# Patient Record
Sex: MALE | Race: WHITE | NOT HISPANIC OR LATINO | Employment: OTHER | URBAN - METROPOLITAN AREA
[De-identification: names, ages, dates, MRNs, and addresses within clinical notes are randomized per-mention and may not be internally consistent; named-entity substitution may affect disease eponyms.]

---

## 2018-07-25 ENCOUNTER — TELEPHONE (OUTPATIENT)
Dept: CARDIOLOGY CLINIC | Facility: CLINIC | Age: 63
End: 2018-07-25

## 2018-07-25 RX ORDER — CYCLOBENZAPRINE HCL 10 MG
1 TABLET ORAL 3 TIMES DAILY PRN
COMMUNITY
Start: 2016-08-23 | End: 2019-01-02 | Stop reason: ALTCHOICE

## 2018-07-25 RX ORDER — RABEPRAZOLE SODIUM 20 MG/1
TABLET, DELAYED RELEASE ORAL EVERY 24 HOURS
COMMUNITY
Start: 2014-06-17

## 2018-07-25 RX ORDER — OLMESARTAN MEDOXOMIL 20 MG/1
TABLET ORAL EVERY 12 HOURS
COMMUNITY
Start: 2017-12-05 | End: 2019-01-04 | Stop reason: SDUPTHER

## 2018-07-25 NOTE — TELEPHONE ENCOUNTER
Pt called, reports he is not feeling well in the evenings  At that time, his BP is 82/47  Since you placed him on Chlorthalidone 12 5mg QD on 6/19/18, his ankles are no longer swelling but he has noted his BP's trending down  His only other cardiac med is Benicar 20mg BID  What are your recommendations?

## 2018-07-26 NOTE — TELEPHONE ENCOUNTER
Reduce the Benicar to 20 mg daily at bedtime (once daily) but continue the chlorthalidone since it is reducing the swelling in his legs  Have him report back to us in several days to a week whether his lightheadedness has resolved and his blood pressure is above 840 systolic all the time

## 2018-12-19 PROBLEM — E78.2 MIXED HYPERLIPIDEMIA: Status: ACTIVE | Noted: 2018-12-19

## 2019-01-02 ENCOUNTER — OFFICE VISIT (OUTPATIENT)
Dept: CARDIOLOGY CLINIC | Facility: CLINIC | Age: 64
End: 2019-01-02
Payer: COMMERCIAL

## 2019-01-02 VITALS
HEIGHT: 74 IN | SYSTOLIC BLOOD PRESSURE: 130 MMHG | OXYGEN SATURATION: 98 % | HEART RATE: 61 BPM | WEIGHT: 262 LBS | BODY MASS INDEX: 33.62 KG/M2 | DIASTOLIC BLOOD PRESSURE: 80 MMHG

## 2019-01-02 DIAGNOSIS — I10 ESSENTIAL HYPERTENSION: Primary | ICD-10-CM

## 2019-01-02 DIAGNOSIS — E78.2 MIXED HYPERLIPIDEMIA: ICD-10-CM

## 2019-01-02 PROCEDURE — 93000 ELECTROCARDIOGRAM COMPLETE: CPT | Performed by: INTERNAL MEDICINE

## 2019-01-02 PROCEDURE — 99214 OFFICE O/P EST MOD 30 MIN: CPT | Performed by: INTERNAL MEDICINE

## 2019-01-02 NOTE — PROGRESS NOTES
Cardiology Follow Up    Lai Nelson  1955  [id number]  [address] [facility]  [address], [facility] [address] [phone]  [phone]    1  Essential hypertension  POCT ECG   2  Mixed hyperlipidemia         Patient was originally seen in my old office for treatment of hypertension and hyperlipidemia  6/27/2018 FLP:  Total cholesterol 194, triglycerides 115, HDL 44, non HDL cholesterol 150, LDL calculated 127    Interval History:   Patient returns  Last visit he was started on a mild diuretic for for an increase in his blood pressure  He took it for about a month and did not feel good and stopped it  However since then he has been very careful about his salt  He reports that most the time his blood pressure is less than 849 systolic  Patient Active Problem List   Diagnosis    Hypertension    Mixed hyperlipidemia     No past medical history on file  Social History     Social History    Marital status: /Civil Union     Spouse name: N/A    Number of children: N/A    Years of education: N/A     Occupational History    Not on file  Social History Main Topics    Smoking status: Never Smoker    Smokeless tobacco: Not on file    Alcohol use Not on file    Drug use: Unknown    Sexual activity: Not on file     Other Topics Concern    Not on file     Social History Narrative    No narrative on file      Family History   Problem Relation Age of Onset    Coronary artery disease Mother     Lung cancer Father      Past Surgical History:   Procedure Laterality Date    APPENDECTOMY      At age 25         Current Outpatient Prescriptions:     olmesartan (BENICAR) 20 mg tablet, Every 12 hours, Disp: , Rfl:     RABEprazole (ACIPHEX) 20 MG tablet, every 24 hours, Disp: , Rfl:   No Known Allergies    Results for orders placed or performed in visit on 01/02/19   POCT ECG    Narrative    Normal sinus rhythm at a rate of 61 beats per minute  Borderline 1st degree AV block at 214 milliseconds  Borderline EKG  Review of Systems:  Review of Systems   Constitutional: Negative for activity change  Respiratory: Negative for cough, chest tightness, shortness of breath and wheezing  Cardiovascular: Negative for chest pain, palpitations and leg swelling  Musculoskeletal: Negative for gait problem  Skin: Negative for color change  Neurological: Negative for dizziness, tremors, syncope, weakness, light-headedness and headaches  Psychiatric/Behavioral: Negative for agitation and confusion  Physical Exam:  /80 (BP Location: Left arm, Patient Position: Sitting, Cuff Size: Adult)   Pulse 61   Ht 6' 2" (1 88 m)   Wt 119 kg (262 lb)   SpO2 98%   BMI 33 64 kg/m²   Physical Exam   Constitutional: He is oriented to person, place, and time  He appears well-developed and well-nourished  No distress  HENT:   Head: Normocephalic and atraumatic  Neck: No JVD present  Cardiovascular: Normal rate, regular rhythm, normal heart sounds and intact distal pulses  Exam reveals no gallop and no friction rub  No murmur heard  Pulmonary/Chest: Effort normal and breath sounds normal  No respiratory distress  He has no wheezes  He has no rales  He exhibits no tenderness  Abdominal: Soft  Bowel sounds are normal  He exhibits no distension  Musculoskeletal: He exhibits no edema  Neurological: He is alert and oriented to person, place, and time  Skin: Skin is warm and dry  Psychiatric: He has a normal mood and affect  His behavior is normal        Discussion/Summary:  1  Continue present medications and follow blood pressure  2    Return in 6 months

## 2019-01-04 DIAGNOSIS — I10 HYPERTENSION, UNSPECIFIED TYPE: Primary | ICD-10-CM

## 2019-01-07 RX ORDER — OLMESARTAN MEDOXOMIL 20 MG/1
20 TABLET ORAL 2 TIMES DAILY
Qty: 180 TABLET | Refills: 6 | Status: SHIPPED | OUTPATIENT
Start: 2019-01-07 | End: 2019-05-31 | Stop reason: RX

## 2019-05-11 ENCOUNTER — OFFICE VISIT (OUTPATIENT)
Dept: URGENT CARE | Facility: MEDICAL CENTER | Age: 64
End: 2019-05-11
Payer: COMMERCIAL

## 2019-05-11 VITALS
WEIGHT: 231 LBS | SYSTOLIC BLOOD PRESSURE: 134 MMHG | HEART RATE: 96 BPM | HEIGHT: 73 IN | DIASTOLIC BLOOD PRESSURE: 80 MMHG | OXYGEN SATURATION: 97 % | RESPIRATION RATE: 20 BRPM | TEMPERATURE: 99.6 F | BODY MASS INDEX: 30.62 KG/M2

## 2019-05-11 DIAGNOSIS — J02.9 SORE THROAT: ICD-10-CM

## 2019-05-11 DIAGNOSIS — J40 BRONCHITIS: Primary | ICD-10-CM

## 2019-05-11 LAB — S PYO AG THROAT QL: NEGATIVE

## 2019-05-11 PROCEDURE — S9083 URGENT CARE CENTER GLOBAL: HCPCS | Performed by: PHYSICIAN ASSISTANT

## 2019-05-11 PROCEDURE — 87070 CULTURE OTHR SPECIMN AEROBIC: CPT | Performed by: PHYSICIAN ASSISTANT

## 2019-05-11 PROCEDURE — 87430 STREP A AG IA: CPT | Performed by: PHYSICIAN ASSISTANT

## 2019-05-11 PROCEDURE — G0383 LEV 4 HOSP TYPE B ED VISIT: HCPCS | Performed by: PHYSICIAN ASSISTANT

## 2019-05-11 RX ORDER — PREDNISONE 10 MG/1
TABLET ORAL
Qty: 18 TABLET | Refills: 0 | Status: SHIPPED | OUTPATIENT
Start: 2019-05-11 | End: 2019-06-24 | Stop reason: ALTCHOICE

## 2019-05-11 RX ORDER — BENZONATATE 200 MG/1
200 CAPSULE ORAL 3 TIMES DAILY PRN
Qty: 20 CAPSULE | Refills: 0 | Status: SHIPPED | OUTPATIENT
Start: 2019-05-11 | End: 2019-06-24 | Stop reason: ALTCHOICE

## 2019-05-14 LAB — BACTERIA THROAT CULT: NORMAL

## 2019-05-31 ENCOUNTER — TELEPHONE (OUTPATIENT)
Dept: CARDIOLOGY CLINIC | Facility: CLINIC | Age: 64
End: 2019-05-31

## 2019-05-31 DIAGNOSIS — I10 ESSENTIAL HYPERTENSION, BENIGN: Primary | ICD-10-CM

## 2019-05-31 RX ORDER — LISINOPRIL 20 MG/1
20 TABLET ORAL DAILY
Qty: 30 TABLET | Refills: 5 | Status: SHIPPED | OUTPATIENT
Start: 2019-05-31 | End: 2019-06-24 | Stop reason: ALTCHOICE

## 2019-06-24 ENCOUNTER — OFFICE VISIT (OUTPATIENT)
Dept: CARDIOLOGY CLINIC | Facility: CLINIC | Age: 64
End: 2019-06-24
Payer: COMMERCIAL

## 2019-06-24 VITALS
DIASTOLIC BLOOD PRESSURE: 80 MMHG | SYSTOLIC BLOOD PRESSURE: 120 MMHG | HEIGHT: 73 IN | OXYGEN SATURATION: 98 % | BODY MASS INDEX: 30.32 KG/M2 | HEART RATE: 66 BPM | WEIGHT: 228.8 LBS

## 2019-06-24 DIAGNOSIS — E78.2 MIXED HYPERLIPIDEMIA: ICD-10-CM

## 2019-06-24 DIAGNOSIS — I10 ESSENTIAL HYPERTENSION: Primary | ICD-10-CM

## 2019-06-24 PROCEDURE — 99214 OFFICE O/P EST MOD 30 MIN: CPT | Performed by: INTERNAL MEDICINE

## 2019-06-24 RX ORDER — OLMESARTAN MEDOXOMIL 20 MG/1
20 TABLET ORAL 2 TIMES DAILY
Qty: 180 TABLET | Refills: 3 | Status: SHIPPED | OUTPATIENT
Start: 2019-06-24 | End: 2020-06-08 | Stop reason: SDUPTHER

## 2019-06-24 RX ORDER — OLMESARTAN MEDOXOMIL 20 MG/1
20 TABLET ORAL 2 TIMES DAILY
Qty: 180 TABLET | Refills: 3
Start: 2019-06-24 | End: 2019-06-24 | Stop reason: SDUPTHER

## 2019-06-24 RX ORDER — OLMESARTAN MEDOXOMIL 20 MG/1
20 TABLET ORAL 2 TIMES DAILY
Qty: 180 TABLET | Refills: 3 | Status: SHIPPED | OUTPATIENT
Start: 2019-06-24 | End: 2019-06-24 | Stop reason: SDUPTHER

## 2020-06-08 ENCOUNTER — OFFICE VISIT (OUTPATIENT)
Dept: CARDIOLOGY CLINIC | Facility: CLINIC | Age: 65
End: 2020-06-08
Payer: MEDICARE

## 2020-06-08 VITALS
DIASTOLIC BLOOD PRESSURE: 80 MMHG | SYSTOLIC BLOOD PRESSURE: 136 MMHG | BODY MASS INDEX: 27.97 KG/M2 | WEIGHT: 212 LBS | TEMPERATURE: 97.8 F

## 2020-06-08 DIAGNOSIS — E78.2 MIXED HYPERLIPIDEMIA: ICD-10-CM

## 2020-06-08 DIAGNOSIS — I10 ESSENTIAL HYPERTENSION: Primary | ICD-10-CM

## 2020-06-08 DIAGNOSIS — N52.9 ERECTILE DYSFUNCTION, UNSPECIFIED ERECTILE DYSFUNCTION TYPE: ICD-10-CM

## 2020-06-08 PROCEDURE — 99214 OFFICE O/P EST MOD 30 MIN: CPT | Performed by: INTERNAL MEDICINE

## 2020-06-08 RX ORDER — SILDENAFIL 25 MG/1
25 TABLET, FILM COATED ORAL DAILY PRN
Qty: 30 TABLET | Refills: 3 | Status: SHIPPED | OUTPATIENT
Start: 2020-06-08 | End: 2021-06-28 | Stop reason: SDUPTHER

## 2020-06-08 RX ORDER — OLMESARTAN MEDOXOMIL 20 MG/1
20 TABLET ORAL 2 TIMES DAILY
Qty: 180 TABLET | Refills: 3 | Status: SHIPPED | OUTPATIENT
Start: 2020-06-08 | End: 2021-05-25

## 2020-11-30 ENCOUNTER — OFFICE VISIT (OUTPATIENT)
Dept: UROLOGY | Facility: MEDICAL CENTER | Age: 65
End: 2020-11-30
Payer: MEDICARE

## 2020-11-30 VITALS
BODY MASS INDEX: 27.46 KG/M2 | HEIGHT: 74 IN | DIASTOLIC BLOOD PRESSURE: 86 MMHG | WEIGHT: 214 LBS | HEART RATE: 81 BPM | SYSTOLIC BLOOD PRESSURE: 122 MMHG

## 2020-11-30 DIAGNOSIS — Z87.442 PERSONAL HISTORY OF KIDNEY STONES: ICD-10-CM

## 2020-11-30 DIAGNOSIS — N13.8 BENIGN PROSTATIC HYPERPLASIA WITH URINARY OBSTRUCTION: Primary | ICD-10-CM

## 2020-11-30 DIAGNOSIS — N40.1 BENIGN PROSTATIC HYPERPLASIA WITH URINARY OBSTRUCTION: Primary | ICD-10-CM

## 2020-11-30 LAB
SL AMB  POCT GLUCOSE, UA: ABNORMAL
SL AMB LEUKOCYTE ESTERASE,UA: ABNORMAL
SL AMB POCT BILIRUBIN,UA: ABNORMAL
SL AMB POCT BLOOD,UA: ABNORMAL
SL AMB POCT CLARITY,UA: CLEAR
SL AMB POCT COLOR,UA: ABNORMAL
SL AMB POCT KETONES,UA: 15
SL AMB POCT NITRITE,UA: ABNORMAL
SL AMB POCT PH,UA: 5.5
SL AMB POCT SPECIFIC GRAVITY,UA: 1.02
SL AMB POCT URINE PROTEIN: ABNORMAL
SL AMB POCT UROBILINOGEN: 0.2

## 2020-11-30 PROCEDURE — 81003 URINALYSIS AUTO W/O SCOPE: CPT | Performed by: UROLOGY

## 2020-11-30 PROCEDURE — 99204 OFFICE O/P NEW MOD 45 MIN: CPT | Performed by: UROLOGY

## 2020-12-01 ENCOUNTER — HOSPITAL ENCOUNTER (OUTPATIENT)
Dept: ULTRASOUND IMAGING | Facility: MEDICAL CENTER | Age: 65
Discharge: HOME | End: 2020-12-01
Attending: UROLOGY
Payer: MEDICARE

## 2020-12-01 DIAGNOSIS — N13.8 BENIGN PROSTATIC HYPERPLASIA WITH URINARY OBSTRUCTION: ICD-10-CM

## 2020-12-01 DIAGNOSIS — N40.1 BENIGN PROSTATIC HYPERPLASIA WITH URINARY OBSTRUCTION: ICD-10-CM

## 2020-12-01 DIAGNOSIS — Z87.442 PERSONAL HISTORY OF KIDNEY STONES: ICD-10-CM

## 2020-12-01 PROCEDURE — 51798 US URINE CAPACITY MEASURE: CPT

## 2020-12-04 ENCOUNTER — TELEPHONE (OUTPATIENT)
Dept: UROLOGY | Facility: MEDICAL CENTER | Age: 65
End: 2020-12-04

## 2021-05-03 ENCOUNTER — TELEPHONE (OUTPATIENT)
Dept: CARDIOLOGY CLINIC | Facility: CLINIC | Age: 66
End: 2021-05-03

## 2021-05-03 NOTE — TELEPHONE ENCOUNTER
Mr Margareth Concepcion sent notice through [employer] stating that the patient was needing a prior authorization for his Olmesartan medoxomil  Prior Auth was completed through [employer]  Key: [id number]  Waiting on determination

## 2021-05-04 NOTE — TELEPHONE ENCOUNTER
Prior Authorization: Approved  Start Date: 05/03/2021  End Date: until Further notice  Notice will be scanned into patient's chart

## 2021-05-25 DIAGNOSIS — I10 ESSENTIAL HYPERTENSION: ICD-10-CM

## 2021-05-25 RX ORDER — OLMESARTAN MEDOXOMIL 20 MG/1
TABLET ORAL
Qty: 180 TABLET | Refills: 3 | Status: SHIPPED | OUTPATIENT
Start: 2021-05-25 | End: 2022-05-25

## 2021-06-23 ENCOUNTER — OFFICE VISIT (OUTPATIENT)
Dept: UROLOGY | Facility: MEDICAL CENTER | Age: 66
End: 2021-06-23
Payer: MEDICARE

## 2021-06-23 VITALS
BODY MASS INDEX: 28.49 KG/M2 | WEIGHT: 222 LBS | HEART RATE: 65 BPM | HEIGHT: 74 IN | DIASTOLIC BLOOD PRESSURE: 80 MMHG | SYSTOLIC BLOOD PRESSURE: 132 MMHG

## 2021-06-23 DIAGNOSIS — N40.1 BENIGN PROSTATIC HYPERPLASIA WITH URINARY OBSTRUCTION: Primary | ICD-10-CM

## 2021-06-23 DIAGNOSIS — Z87.442 PERSONAL HISTORY OF KIDNEY STONES: ICD-10-CM

## 2021-06-23 DIAGNOSIS — N13.8 BENIGN PROSTATIC HYPERPLASIA WITH URINARY OBSTRUCTION: Primary | ICD-10-CM

## 2021-06-23 PROCEDURE — 99214 OFFICE O/P EST MOD 30 MIN: CPT | Performed by: UROLOGY

## 2021-06-23 NOTE — PROGRESS NOTES
Assessment/Plan:    Benign prostatic hyperplasia with urinary obstruction  The patient's voiding symptoms have been getting worse  AUA symptom score is 20  PSA is normal at 2 39 (June 4, 2021)  He is mixed about his voiding pattern  Options were discussed including medical therapy, urolift, GreenLight laser and transurethral resection of the prostate  At the conclusion of our discussion I recommended the patient return for further evaluation to include uroflow with postvoid residual, cystoscopy, transrectal ultrasound for prostate size  The patient may also wish to try saw palmetto  Personal history of kidney stones  The patient remained asymptomatic  Ultrasound in early 2020 did not reveal any residual kidney stones  Diagnoses and all orders for this visit:    Benign prostatic hyperplasia with urinary obstruction    Personal history of kidney stones          Subjective:      Patient ID: Quincy Hamm is a 77 y o  male  Benign Prostatic Hypertrophy  This is a new problem  The current episode started more than 1 month ago  The problem has been gradually worsening since onset  Irritative symptoms include frequency (occasional) and nocturia (Nocturia x 3)  Irritative symptoms do not include urgency  Obstructive symptoms include dribbling, incomplete emptying, an intermittent stream and a slower stream  Obstructive symptoms do not include straining or a weak stream  Pertinent negatives include no chills, genital pain, hematuria or hesitancy  AUA score is 20-35  He is sexually active  Nothing aggravates the symptoms  Past treatments include tamsulosin  The treatment provided no relief  He did not like the side effects of the flomax  History of kidney stones   Patient had ureteroscopy in [place] in January 2019  He has not recently passed any stones  He remains asymptomatic      The following portions of the patient's history were reviewed and updated as appropriate: allergies, current medications, past family history, past medical history, past social history, past surgical history and problem list     Review of Systems   Constitutional: Negative for chills, diaphoresis, fatigue and fever  HENT: Negative  Eyes: Negative  Respiratory: Negative  Cardiovascular: Negative  Gastrointestinal: Negative  Endocrine: Negative  Genitourinary: Positive for frequency (occasional), incomplete emptying and nocturia (Nocturia x 3)  Negative for hematuria, hesitancy and urgency  See HPI   Musculoskeletal: Negative  Skin: Negative  Allergic/Immunologic: Negative  Neurological: Negative  Hematological: Negative  Psychiatric/Behavioral: Negative  AUA SYMPTOM SCORE      Most Recent Value   AUA SYMPTOM SCORE   How often have you had a sensation of not emptying your bladder completely after you finished urinating? 3   How often have you had to urinate again less than two hours after you finished urinating? 5   How often have you found you stopped and started again several times when you urinate? 3   How often have you found it difficult to postpone urination? 2   How often have you had a weak urinary stream?  2   How often have you had to push or strain to begin urination? 2   How many times did you most typically get up to urinate from the time you went to bed at night until the time you got up in the morning? 3   Quality of Life: If you were to spend the rest of your life with your urinary condition just the way it is now, how would you feel about that?  3   AUA SYMPTOM SCORE  20          Objective:      /80   Pulse 65   Ht 6' 1 5" (1 867 m)   Wt 101 kg (222 lb)   BMI 28 89 kg/m²          Physical Exam  Vitals reviewed  Constitutional:       General: He is not in acute distress  Appearance: Normal appearance  He is well-developed and normal weight  He is not ill-appearing, toxic-appearing or diaphoretic     HENT:      Head: Normocephalic and atraumatic  Eyes:      General: No scleral icterus  Conjunctiva/sclera: Conjunctivae normal    Cardiovascular:      Rate and Rhythm: Normal rate  Pulmonary:      Effort: Pulmonary effort is normal    Abdominal:      General: Bowel sounds are normal  There is no distension  Palpations: Abdomen is soft  There is no mass  Tenderness: There is no abdominal tenderness  There is no right CVA tenderness, left CVA tenderness, guarding or rebound  Hernia: A hernia is present  Genitourinary:     Penis: Normal  No phimosis or hypospadias  Testes: Normal          Right: Mass not present  Left: Mass not present  Rectum: Normal       Comments: Prostate 2 5 + enlarged and palpably benign  Musculoskeletal:      Cervical back: Neck supple  Skin:     General: Skin is warm and dry  Neurological:      General: No focal deficit present  Mental Status: He is alert and oriented to person, place, and time  Psychiatric:         Mood and Affect: Mood normal          Behavior: Behavior normal          Thought Content:  Thought content normal          Judgment: Judgment normal

## 2021-06-23 NOTE — ASSESSMENT & PLAN NOTE
The patient remained asymptomatic  Ultrasound in early 2020 did not reveal any residual kidney stones

## 2021-06-23 NOTE — PATIENT INSTRUCTIONS
Saw  Zahra Robles can be used  Enlarged Prostate (BPH)   WHAT YOU NEED TO KNOW:   An enlarged prostate (BPH) develops because the number of prostate cells increases (hyperplasia) or the cells get bigger (hypertrophy)  BPH causes urinary system problems that can get worse over time  You can work with healthcare providers and take steps to manage BPH  DISCHARGE INSTRUCTIONS:   Call your doctor or urologist if:   · You see blood in your urine  · You are not able to urinate  · Your bladder feels very full and painful  · You have new or worsening symptoms  · You have a fever  · You have questions or concerns about your condition or care  Medicines: You may need any of the following:  · Medicines  may be used to relax the muscles in your prostate and bladder  This may help you urinate more easily  Medicines may also be used to block the production of a hormone that causes the prostate to get larger  It may help to slow the growth of the prostate or shrink the prostate  · Diuretics  (water pills) may be used to relieve fluid buildup  You will need to take these in the morning or afternoon because they may cause you to urinate more often  Do not take them before bedtime  · Take your medicine as directed  Contact your healthcare provider if you think your medicine is not helping or if you have side effects  Tell him or her if you are allergic to any medicine  Keep a list of the medicines, vitamins, and herbs you take  Include the amounts, and when and why you take them  Bring the list or the pill bottles to follow-up visits  Carry your medicine list with you in case of an emergency  What you can do to manage BPH:   · Urinate on a regular schedule  This will train your bladder to hold urine longer  A larger amount of urine may make it easier to urinate  · Talk to your healthcare provider about all your medicines  This includes prescription and over-the-counter medicines   Some medicines can make BPH worse  Do not start any new medicines before you talk to your provider  · Drink less liquid during the day  Do not have liquid for several hours before you go to bed at night  Do not drink large amounts of any liquid at one time  · Limit alcohol and caffeine  These can irritate your bladder and make your symptoms worse  · Eat less salt  Salt can cause fluid buildup and make it harder to urinate  Examples of salty foods are chips, cured meats, and canned soups  Do not use table salt  · Elevate your legs if you have swelling  Elevate (raise) your legs above the level of your heart  This can relieve swelling caused by fluid buildup  You may not have to get up in the night to urinate  · Lose weight if you are overweight  Obesity increases your risk for obstructive sleep apnea (SAMANTHA)  SAMANTHA can make you need to get up in the night to urinate  Exercise can help you reach or maintain a healthy weight  A lack of exercise may make BPH symptoms worse  Aim to get at least 30 minutes of exercise on most days of the week  Follow up with your doctor or urologist as directed:  Write down your questions so you remember to ask them during your visits  © Copyright [address] [place] [address] Drive Information is for End User's use only and may not be sold, redistributed or otherwise used for commercial purposes  All illustrations and images included in CareNotes® are the copyrighted property of [employer]  or [facility] Jessica Muñoz   The above information is an  only  It is not intended as medical advice for individual conditions or treatments  Talk to your doctor, nurse or pharmacist before following any medical regimen to see if it is safe and effective for you

## 2021-06-23 NOTE — ASSESSMENT & PLAN NOTE
The patient's voiding symptoms have been getting worse  AUA symptom score is 20  PSA is normal at 2 39 (June 4, 2021)  He is mixed about his voiding pattern  Options were discussed including medical therapy, urolift, GreenLight laser and transurethral resection of the prostate  At the conclusion of our discussion I recommended the patient return for further evaluation to include uroflow with postvoid residual, cystoscopy, transrectal ultrasound for prostate size  The patient may also wish to try saw palmetto

## 2021-06-28 ENCOUNTER — OFFICE VISIT (OUTPATIENT)
Dept: CARDIOLOGY CLINIC | Facility: CLINIC | Age: 66
End: 2021-06-28
Payer: MEDICARE

## 2021-06-28 VITALS
DIASTOLIC BLOOD PRESSURE: 82 MMHG | WEIGHT: 220 LBS | HEART RATE: 60 BPM | SYSTOLIC BLOOD PRESSURE: 134 MMHG | BODY MASS INDEX: 29.16 KG/M2 | HEIGHT: 73 IN

## 2021-06-28 DIAGNOSIS — N52.9 ERECTILE DYSFUNCTION, UNSPECIFIED ERECTILE DYSFUNCTION TYPE: ICD-10-CM

## 2021-06-28 DIAGNOSIS — I10 ESSENTIAL HYPERTENSION: Primary | ICD-10-CM

## 2021-06-28 DIAGNOSIS — E78.2 MIXED HYPERLIPIDEMIA: ICD-10-CM

## 2021-06-28 PROCEDURE — 93000 ELECTROCARDIOGRAM COMPLETE: CPT | Performed by: INTERNAL MEDICINE

## 2021-06-28 PROCEDURE — 99214 OFFICE O/P EST MOD 30 MIN: CPT | Performed by: INTERNAL MEDICINE

## 2021-06-28 RX ORDER — SILDENAFIL 25 MG/1
25 TABLET, FILM COATED ORAL DAILY PRN
Qty: 90 TABLET | Refills: 3 | Status: SHIPPED | OUTPATIENT
Start: 2021-06-28 | End: 2021-10-27

## 2021-06-28 NOTE — LETTER
June 28, 2021     Chris Valentino MD  [address]    Patient: Scarlett Son   YOB: 1955   Date of Visit: 6/28/2021       Dear Dr Crista High:    Thank you for referring Michela Thurston to me for evaluation  Below are my notes for this consultation  If you have questions, please do not hesitate to call me  I look forward to following your patient along with you  Sincerely,        Eda Miller MD        CC: No Recipients  Eda Miller MD  6/28/2021 11:36 AM  Sign when Signing Visit                                             Cardiology Follow Up    Scarlett Son  1955  [id number]  [address]  [address] [facility] [address] [phone]    1  Essential hypertension  POCT ECG   2  Mixed hyperlipidemia  POCT ECG   3  Erectile dysfunction, unspecified erectile dysfunction type  sildenafil (VIAGRA) 25 MG tablet       Patient was originally seen in my old office for treatment of hypertension and hyperlipidemia      05/29/2020 lipid panel:  Total cholesterol 202, triglycerides 126, HDL 42, LDL calculated 135, non HDL cholesterol 160   06/04/2021 lipid profile cholesterol 214, triglycerides 135, HDL 45, LDL calculated 142, non HDL cholesterol 169    Interval History: Patient has no cardiac complaints  Patient denies chest discomfort or shortness of breath  Patient has no palpitations  Patient denies symptoms of dizziness, lightheadedness or near-syncope/syncope  Patient denies leg edema  Patient denies symptoms of orthopnea or paroxysmal nocturnal dyspnea  Patient does not like to take medications  His blood pressure is acceptable  His LDL continues to increase  Discussed with him beginning a statin  He is very reluctant to do so but will think about it  Discussed use of pravastatin 80 mg daily if he decides to begin a statin      Patient is planning to have knee surgery in the fall with a knee replacement  Discussion/Summary:    Return in 6 months    Patient Active Problem List   Diagnosis    Hypertension    Mixed hyperlipidemia    Benign prostatic hyperplasia with urinary obstruction    Personal history of kidney stones     Past Medical History:   Diagnosis Date    GERD (gastroesophageal reflux disease)     Hypertension      Social History     Socioeconomic History    Marital status: /Civil Union     Spouse name: Not on file    Number of children: Not on file    Years of education: Not on file    Highest education level: Not on file   Occupational History    Not on file   Tobacco Use    Smoking status: Never Smoker    Smokeless tobacco: Never Used   Substance and Sexual Activity    Alcohol use: Not on file    Drug use: Not on file    Sexual activity: Not on file   Other Topics Concern    Not on file   Social History Narrative    Not on file     Social Determinants of Health     Financial Resource Strain:     Difficulty of Paying Living Expenses:    Food Insecurity:     Worried About Running Out of Food in the Last Year:     Constanza of Food in the Last Year:    Transportation Needs:     Lack of Transportation (Medical):      Lack of Transportation (Non-Medical):    Physical Activity:     Days of Exercise per Week:     Minutes of Exercise per Session:    Stress:     Feeling of Stress :    Social Connections:     Frequency of Communication with Friends and Family:     Frequency of Social Gatherings with Friends and Family:     Attends L[employer] Services:     Active Member of Clubs or Organizations:     Attends Club or Organization Meetings:     Marital Status:    Intimate Partner Violence:     Fear of Current or Ex-Partner:     Emotionally Abused:     Physically Abused:     Sexually Abused:       Family History   Problem Relation Age of Onset    Coronary artery disease Mother     Lung cancer Father      Past Surgical History:   Procedure Laterality Date    APPENDECTOMY      At age 25  Current Outpatient Medications:     olmesartan (BENICAR) 20 mg tablet, TAKE ONE TABLET BY MOUTH TWICE DAILY , Disp: 180 tablet, Rfl: 3    RABEprazole (ACIPHEX) 20 MG tablet, every 24 hours, Disp: , Rfl:     sildenafil (VIAGRA) 25 MG tablet, Take 1 tablet (25 mg total) by mouth daily as needed for erectile dysfunction, Disp: 90 tablet, Rfl: 3  No Known Allergies    Results for orders placed or performed in visit on 06/28/21   POCT ECG    Narrative     Normal sinus rhythm at a rate of 60 beats per minute  Normal EKG  Review of Systems:  Review of Systems   Constitutional: Negative for activity change  Respiratory: Negative for cough, chest tightness, shortness of breath and wheezing  Cardiovascular: Negative for chest pain, palpitations and leg swelling  Musculoskeletal: Negative for gait problem  Skin: Negative for color change  Neurological: Negative for dizziness, tremors, syncope, weakness, light-headedness and headaches  Psychiatric/Behavioral: Negative for agitation and confusion  Physical Exam:  /82   Pulse 60   Ht 6' 1" (1 854 m)   Wt 99 8 kg (220 lb)   BMI 29 03 kg/m²   Physical Exam  Vitals reviewed  Constitutional:       General: He is not in acute distress  Appearance: He is well-developed  HENT:      Head: Normocephalic and atraumatic  Neck:      Thyroid: No thyromegaly  Vascular: No carotid bruit or JVD  Trachea: No tracheal deviation  Cardiovascular:      Rate and Rhythm: Normal rate and regular rhythm  Pulses: Normal pulses  Heart sounds: Normal heart sounds  No murmur heard  No friction rub  No gallop  Pulmonary:      Effort: Pulmonary effort is normal  No respiratory distress  Breath sounds: Normal breath sounds  No wheezing, rhonchi or rales  Chest:      Chest wall: No tenderness  Abdominal:      General: Bowel sounds are normal  There is no distension        Palpations: Abdomen is soft  Tenderness: There is no abdominal tenderness  Musculoskeletal:      Cervical back: Normal range of motion and neck supple  Right lower leg: No edema  Left lower leg: No edema  Skin:     General: Skin is warm and dry  Neurological:      General: No focal deficit present  Mental Status: He is alert and oriented to person, place, and time  Gait: Gait normal    Psychiatric:         Mood and Affect: Mood normal          Behavior: Behavior normal          Thought Content: Thought content normal          Judgment: Judgment normal          Imaging:   I have personally reviewed pertinent reports  Portions of the record may have been created with voice recognition software  Occasional wrong word or "sound a like" substitutions may have occurred due to the inherent limitations of voice recognition software  Read the chart carefully and recognize, using context, where substitutions have occurred

## 2021-06-28 NOTE — PROGRESS NOTES
Cardiology Follow Up    Lai Nelson  1955  [id number]  [address]  [id number]  [phone]    1  Essential hypertension  POCT ECG   2  Mixed hyperlipidemia  POCT ECG   3  Erectile dysfunction, unspecified erectile dysfunction type  sildenafil (VIAGRA) 25 MG tablet       Patient was originally seen in my old office for treatment of hypertension and hyperlipidemia      05/29/2020 lipid panel:  Total cholesterol 202, triglycerides 126, HDL 42, LDL calculated 135, non HDL cholesterol 160   06/04/2021 lipid profile cholesterol 214, triglycerides 135, HDL 45, LDL calculated 142, non HDL cholesterol 169    Interval History: Patient has no cardiac complaints  Patient denies chest discomfort or shortness of breath  Patient has no palpitations  Patient denies symptoms of dizziness, lightheadedness or near-syncope/syncope  Patient denies leg edema  Patient denies symptoms of orthopnea or paroxysmal nocturnal dyspnea  Patient does not like to take medications  His blood pressure is acceptable  His LDL continues to increase  Discussed with him beginning a statin  He is very reluctant to do so but will think about it  Discussed use of pravastatin 80 mg daily if he decides to begin a statin  Patient is planning to have knee surgery in the fall with a knee replacement      Discussion/Summary:    Return in 6 months    Patient Active Problem List   Diagnosis    Hypertension    Mixed hyperlipidemia    Benign prostatic hyperplasia with urinary obstruction    Personal history of kidney stones     Past Medical History:   Diagnosis Date    GERD (gastroesophageal reflux disease)     Hypertension      Social History     Socioeconomic History    Marital status: /Civil Union     Spouse name: Not on file    Number of children: Not on file    Years of education: Not on file    Highest education level: Not on file   Occupational History    Not on file   Tobacco Use    Smoking status: Never Smoker    Smokeless tobacco: Never Used   Substance and Sexual Activity    Alcohol use: Not on file    Drug use: Not on file    Sexual activity: Not on file   Other Topics Concern    Not on file   Social History Narrative    Not on file     Social Determinants of Health     Financial Resource Strain:     Difficulty of Paying Living Expenses:    Food Insecurity:     Worried About Running Out of Food in the Last Year:     [address] in the Last Year:    Transportation Needs:     Lack of Transportation (Medical):  Lack of Transportation (Non-Medical):    Physical Activity:     Days of Exercise per Week:     Minutes of Exercise per Session:    Stress:     Feeling of Stress :    Social Connections:     Frequency of Communication with Friends and Family:     Frequency of Social Gatherings with Friends and Family:     Attends [employer] Services:     Active Member of Clubs or Organizations:     Attends Club or Organization Meetings:     Marital Status:    Intimate Partner Violence:     Fear of Current or Ex-Partner:     Emotionally Abused:     Physically Abused:     Sexually Abused:       Family History   Problem Relation Age of Onset    Coronary artery disease Mother     Lung cancer Father      Past Surgical History:   Procedure Laterality Date    APPENDECTOMY      At age 25  Current Outpatient Medications:     olmesartan (BENICAR) 20 mg tablet, TAKE ONE TABLET BY MOUTH TWICE DAILY , Disp: 180 tablet, Rfl: 3    RABEprazole (ACIPHEX) 20 MG tablet, every 24 hours, Disp: , Rfl:     sildenafil (VIAGRA) 25 MG tablet, Take 1 tablet (25 mg total) by mouth daily as needed for erectile dysfunction, Disp: 90 tablet, Rfl: 3  No Known Allergies    Results for orders placed or performed in visit on 06/28/21   POCT ECG    Narrative     Normal sinus rhythm at a rate of 60 beats per minute    Normal EKG          Review of Systems:  Review of Systems   Constitutional: Negative for activity change  Respiratory: Negative for cough, chest tightness, shortness of breath and wheezing  Cardiovascular: Negative for chest pain, palpitations and leg swelling  Musculoskeletal: Negative for gait problem  Skin: Negative for color change  Neurological: Negative for dizziness, tremors, syncope, weakness, light-headedness and headaches  Psychiatric/Behavioral: Negative for agitation and confusion  Physical Exam:  /82   Pulse 60   Ht 6' 1" (1 854 m)   Wt 99 8 kg (220 lb)   BMI 29 03 kg/m²   Physical Exam  Vitals reviewed  Constitutional:       General: He is not in acute distress  Appearance: He is well-developed  HENT:      Head: Normocephalic and atraumatic  Neck:      Thyroid: No thyromegaly  Vascular: No carotid bruit or JVD  Trachea: No tracheal deviation  Cardiovascular:      Rate and Rhythm: Normal rate and regular rhythm  Pulses: Normal pulses  Heart sounds: Normal heart sounds  No murmur heard  No friction rub  No gallop  Pulmonary:      Effort: Pulmonary effort is normal  No respiratory distress  Breath sounds: Normal breath sounds  No wheezing, rhonchi or rales  Chest:      Chest wall: No tenderness  Abdominal:      General: Bowel sounds are normal  There is no distension  Palpations: Abdomen is soft  Tenderness: There is no abdominal tenderness  Musculoskeletal:      Cervical back: Normal range of motion and neck supple  Right lower leg: No edema  Left lower leg: No edema  Skin:     General: Skin is warm and dry  Neurological:      General: No focal deficit present  Mental Status: He is alert and oriented to person, place, and time  Gait: Gait normal    Psychiatric:         Mood and Affect: Mood normal          Behavior: Behavior normal          Thought Content:  Thought content normal          Judgment: Judgment normal          Imaging:   I have personally reviewed pertinent reports  Portions of the record may have been created with voice recognition software  Occasional wrong word or "sound a like" substitutions may have occurred due to the inherent limitations of voice recognition software  Read the chart carefully and recognize, using context, where substitutions have occurred

## 2021-08-25 ENCOUNTER — OFFICE VISIT (OUTPATIENT)
Dept: UROLOGY | Facility: MEDICAL CENTER | Age: 66
End: 2021-08-25
Payer: MEDICARE

## 2021-08-25 VITALS
DIASTOLIC BLOOD PRESSURE: 80 MMHG | BODY MASS INDEX: 29.29 KG/M2 | HEIGHT: 73 IN | WEIGHT: 221 LBS | HEART RATE: 66 BPM | SYSTOLIC BLOOD PRESSURE: 122 MMHG

## 2021-08-25 DIAGNOSIS — N13.8 BENIGN PROSTATIC HYPERPLASIA WITH URINARY OBSTRUCTION: Primary | ICD-10-CM

## 2021-08-25 DIAGNOSIS — N40.1 BENIGN PROSTATIC HYPERPLASIA WITH URINARY OBSTRUCTION: Primary | ICD-10-CM

## 2021-08-25 PROCEDURE — 99214 OFFICE O/P EST MOD 30 MIN: CPT | Performed by: UROLOGY

## 2021-08-25 RX ORDER — ALFUZOSIN HYDROCHLORIDE 10 MG/1
10 TABLET, EXTENDED RELEASE ORAL DAILY
Qty: 90 TABLET | Refills: 3 | Status: SHIPPED | OUTPATIENT
Start: 2021-08-25 | End: 2021-10-27 | Stop reason: ALTCHOICE

## 2021-08-25 NOTE — ASSESSMENT & PLAN NOTE
AUA symptom score is 20  He is mixed about his voiding pattern  Saw palmetto was not effective  He notes that he will be having knee replacement surgery in the near future  I recommended a trial of Uroxatral   This alpha-blocker has the least incidence of  retrograde ejaculation  He is planning on spending the winter in [place]  He will return in May and we will plan further evaluation at that time  Use and side effects of Uroxatral were discussed

## 2021-08-25 NOTE — PATIENT INSTRUCTIONS
Alfuzosin (By mouth)   Alfuzosin ([id number]-sin)  Treats problems with urination caused by an enlarged prostate (also called benign prostatic hyperplasia or BPH)  This medicine is an alpha-blocker  Brand Name(s): Uroxatral   There may be other brand names for this medicine  When This Medicine Should Not Be Used: This medicine is not right for everyone  Do not use it if you had an allergic reaction to alfuzosin, or if you have moderate to severe liver disease  How to Use This Medicine:   [address]  · Your doctor will tell you how much medicine to use  Do not use more than directed  · It is best to take this medicine with food or milk  Take the medicine right after the same meal every day  · Swallow the extended-release tablet whole  Do not crush, break, or chew it  · Read and follow the patient instructions that come with this medicine  Talk to your doctor or pharmacist if you have any questions  · Missed dose: Take a dose as soon as you remember  If it is almost time for your next dose, wait until then and take a regular dose  Do not take extra medicine to make up for a missed dose  · Store the medicine in a closed container at room temperature, away from heat, moisture, and direct light  Drugs and Foods to Avoid:   Ask your doctor or pharmacist before using any other medicine, including over-the-counter medicines, vitamins, and herbal products  · Do not use alfuzosin if you are taking itraconazole, ketoconazole, or ritonavir  · Some medicines can affect how alfuzosin works  Tell your doctor if you are taking any of the following:   ? Cimetidine, diltiazem  ? Blood pressure medicine  ? Medicine to treat erectile dysfunction  ? Nitrate medicine  ? Other alpha-blockers  Warnings While Using This Medicine:   · This medicine is not for use in women    · Tell your doctor if you have kidney disease, liver disease, chest pain (angina), low blood pressure, or heart rhythm problems (including QT prolongation)  · This medicine may make you dizzy or lightheaded  Do not drive or do anything else that could be dangerous until you know how this medicine affects you  Get up slowly from a sitting or lying position  If you feel dizzy, lie down so you do not faint  Then sit for a few moments before you stand up  · Tell your eye doctor (ophthalmologist) that you have used or are using this medicine before cataract surgery or any other eye procedure  This medicine may cause a serious eye problem called Intraoperative Floppy Iris Syndrome (IFIS)  · Call your doctor right away if you have a prolonged erection while you are using this medicine  It must be treated right away to prevent permanent impotence  · Your doctor will check your progress and the effects of this medicine at regular visits  Keep all appointments  · Keep all medicine out of the reach of children  Never share your medicine with anyone  Possible Side Effects While Using This Medicine:   Call your doctor right away if you notice any of these side effects:  · Allergic reaction: Itching or hives, swelling in your face or hands, swelling or tingling in your mouth or throat, chest tightness, trouble breathing  · Chest pain, discomfort, tightness or heaviness, troubled breathing  · Decrease in how much or how often you urinate  · Fast, pounding, or uneven heartbeat  · Lightheadedness, dizziness, or fainting  · Pain in your arm, back, or jaw  · Painful, prolonged erection of your penis  If you notice other side effects that you think are caused by this medicine, tell your doctor  Call your doctor for medical advice about side effects  You may report side effects to FDA at 5-257-VWJ-3988  © Copyright [employer] 2021 Information is for End User's use only and may not be sold, redistributed or otherwise used for commercial purposes  The above information is an  only   It is not intended as medical advice for individual conditions or treatments  Talk to your doctor, nurse or pharmacist before following any medical regimen to see if it is safe and effective for you

## 2021-08-25 NOTE — PROGRESS NOTES
Assessment/Plan:    Benign prostatic hyperplasia with urinary obstruction  AUA symptom score is 20  He is mixed about his voiding pattern  Saw palmetto was not effective  He notes that he will be having knee replacement surgery in the near future  I recommended a trial of Uroxatral   This alpha-blocker has the least incidence of  retrograde ejaculation  He is planning on spending the winter in [place]  He will return in May and we will plan further evaluation at that time  Use and side effects of Uroxatral were discussed  Diagnoses and all orders for this visit:    Benign prostatic hyperplasia with urinary obstruction  -     alfuzosin (UROXATRAL) 10 mg 24 hr tablet; Take 1 tablet (10 mg total) by mouth daily          Subjective:      Patient ID: Veronica Barton is a 77 y o  male  Benign Prostatic Hypertrophy  This is a new problem  The current episode started more than 1 month ago  The problem has been gradually worsening since onset  Irritative symptoms include frequency (occasional) and nocturia (Nocturia x 3)  Irritative symptoms do not include urgency  Obstructive symptoms include dribbling, incomplete emptying, an intermittent stream and a slower stream  Obstructive symptoms do not include straining or a weak stream  Pertinent negatives include no chills, genital pain, hematuria or hesitancy  AUA score is 20-35  He is sexually active  Nothing aggravates the symptoms  Past treatments include tamsulosin and saw palmetto  The treatment provided no relief  He did not like the side effects of the flomax  He briefly tried saw palmetto and this was not effective  History of kidney stones   Patient had ureteroscopy in [place] in January 2019  He has not recently passed any stones  He remains asymptomatic      The following portions of the patient's history were reviewed and updated as appropriate: allergies, current medications, past family history, past medical history, past social history, past surgical history and problem list     Review of Systems   Constitutional: Negative for chills, diaphoresis, fatigue and fever  HENT: Negative  Eyes: Negative  Respiratory: Negative  Cardiovascular: Negative  Gastrointestinal: Negative  Endocrine: Negative  Genitourinary: Positive for frequency (occasional), incomplete emptying and nocturia (Nocturia x 3)  Negative for hematuria, hesitancy and urgency  See HPI   Musculoskeletal: Positive for arthralgias (He will be having knee surgery in the future)  Skin: Negative  Allergic/Immunologic: Negative  Neurological: Negative  Hematological: Negative  Psychiatric/Behavioral: Negative  AUA SYMPTOM SCORE      Most Recent Value   AUA SYMPTOM SCORE   How often have you had a sensation of not emptying your bladder completely after you finished urinating? 2   How often have you had to urinate again less than two hours after you finished urinating? 4   How often have you found you stopped and started again several times when you urinate? 2   How often have you found it difficult to postpone urination? 2   How often have you had a weak urinary stream?  3   How often have you had to push or strain to begin urination? 2   How many times did you most typically get up to urinate from the time you went to bed at night until the time you got up in the morning? 5   Quality of Life: If you were to spend the rest of your life with your urinary condition just the way it is now, how would you feel about that?  3   AUA SYMPTOM SCORE  20           Physical Exam  Vitals reviewed  Constitutional:       General: He is not in acute distress  Appearance: Normal appearance  He is well-developed and normal weight  He is not ill-appearing, toxic-appearing or diaphoretic  HENT:      Head: Normocephalic and atraumatic  Eyes:      General: No scleral icterus       Conjunctiva/sclera: Conjunctivae normal    Cardiovascular:      Rate and Rhythm: Normal rate  Pulmonary:      Effort: Pulmonary effort is normal    Abdominal:      General: Bowel sounds are normal  There is no distension  Palpations: Abdomen is soft  There is no mass  Tenderness: There is no abdominal tenderness  There is no right CVA tenderness, left CVA tenderness, guarding or rebound  Hernia: A hernia is present  Genitourinary:     Penis: No phimosis or hypospadias  Testes:         Right: Mass not present  Left: Mass not present  Musculoskeletal:      Cervical back: Neck supple  Skin:     General: Skin is warm and dry  Neurological:      General: No focal deficit present  Mental Status: He is alert and oriented to person, place, and time  Psychiatric:         Mood and Affect: Mood normal          Behavior: Behavior normal          Thought Content:  Thought content normal          Judgment: Judgment normal

## 2021-09-11 ENCOUNTER — TELEPHONE (OUTPATIENT)
Dept: CARDIOLOGY CLINIC | Facility: CLINIC | Age: 66
End: 2021-09-11

## 2021-09-11 DIAGNOSIS — R61 DIAPHORESIS: ICD-10-CM

## 2021-09-11 DIAGNOSIS — I10 HYPERTENSION, UNSPECIFIED TYPE: Primary | ICD-10-CM

## 2021-09-11 NOTE — TELEPHONE ENCOUNTER
Patient called me and stated that he had an episode of dizziness, feeling foggy in the head and was profusely diaphoretic  He called 911 and was taken to [facility], they kept him in the emergency department holding area overnight  They did tests which included a CMP which had multiple minor abnormalities, hemoglobin A1c which was 5 8 slightly in the prediabetic range, MRI of the brain which was normal, MRA of the extracranial vasculature which was normal, an MRA of the neck which was normal     They made a preliminary diagnosis of vertigo and referred him to an ENT doctor who is seeing the patient on 09/13/2021  They also recommended physical therapy but the patient wanted to see the ENT doctor 1st and discuss physical therapy with him  Altogether he has had 4 episodes of this 5 the feeling in his head with diaphoresis  They have been sporadic and not related to activity, eating or time of day  The last episode occurred this morning and awoke him up from his sleep at approximately 7:00 a m  He laid very still in bed for approximately 45 minutes and the episode abated  While laying in bed, his wife took his blood pressure and it was 172/109  impressions:  1  Rule out pheochromocytoma  2  Rule out carcinoid syndrome  3  If #1 and #2 are ruled out, consider hypoglycemia although unlikely    Plan:  1   Repeat CMP, fasting  2  24 hour urine for metanephrines  3  24 hour urine for 5 HIAA

## 2021-10-24 PROBLEM — I95.89 HYPOTENSION DUE TO HYPOVOLEMIA: Status: ACTIVE | Noted: 2021-10-24

## 2021-10-24 PROBLEM — E86.1 HYPOTENSION DUE TO HYPOVOLEMIA: Status: ACTIVE | Noted: 2021-10-24

## 2021-10-24 PROBLEM — R42 DIZZINESS: Status: ACTIVE | Noted: 2021-10-24

## 2021-10-27 ENCOUNTER — OFFICE VISIT (OUTPATIENT)
Dept: CARDIOLOGY CLINIC | Facility: CLINIC | Age: 66
End: 2021-10-27
Payer: MEDICARE

## 2021-10-27 VITALS
OXYGEN SATURATION: 98 % | WEIGHT: 211 LBS | BODY MASS INDEX: 27.96 KG/M2 | HEIGHT: 73 IN | SYSTOLIC BLOOD PRESSURE: 100 MMHG | DIASTOLIC BLOOD PRESSURE: 72 MMHG | HEART RATE: 77 BPM

## 2021-10-27 DIAGNOSIS — I95.89 HYPOTENSION DUE TO HYPOVOLEMIA: Primary | ICD-10-CM

## 2021-10-27 DIAGNOSIS — E86.1 HYPOTENSION DUE TO HYPOVOLEMIA: Primary | ICD-10-CM

## 2021-10-27 DIAGNOSIS — I10 PRIMARY HYPERTENSION: ICD-10-CM

## 2021-10-27 DIAGNOSIS — E78.2 MIXED HYPERLIPIDEMIA: ICD-10-CM

## 2021-10-27 PROCEDURE — 99215 OFFICE O/P EST HI 40 MIN: CPT | Performed by: INTERNAL MEDICINE

## 2021-10-27 RX ORDER — MECLIZINE HYDROCHLORIDE 25 MG/1
TABLET ORAL
COMMUNITY
Start: 2021-10-26 | End: 2021-12-15 | Stop reason: ALTCHOICE

## 2021-10-27 RX ORDER — ASPIRIN 81 MG/1
TABLET ORAL EVERY 12 HOURS
COMMUNITY
Start: 2021-10-26 | End: 2021-12-15 | Stop reason: ALTCHOICE

## 2021-10-27 RX ORDER — CELECOXIB 200 MG/1
CAPSULE ORAL
COMMUNITY
End: 2021-12-15 | Stop reason: ALTCHOICE

## 2021-10-27 RX ORDER — OXYCODONE HYDROCHLORIDE 5 MG/1
TABLET ORAL
COMMUNITY
Start: 2021-10-15 | End: 2021-12-15 | Stop reason: ALTCHOICE

## 2021-11-01 ENCOUNTER — TELEPHONE (OUTPATIENT)
Dept: CARDIOLOGY CLINIC | Facility: CLINIC | Age: 66
End: 2021-11-01

## 2021-12-17 ENCOUNTER — OFFICE VISIT (OUTPATIENT)
Dept: CARDIOLOGY CLINIC | Facility: CLINIC | Age: 66
End: 2021-12-17
Payer: MEDICARE

## 2021-12-17 VITALS
DIASTOLIC BLOOD PRESSURE: 78 MMHG | BODY MASS INDEX: 28.76 KG/M2 | HEART RATE: 76 BPM | HEIGHT: 73 IN | SYSTOLIC BLOOD PRESSURE: 118 MMHG | WEIGHT: 217 LBS | RESPIRATION RATE: 16 BRPM

## 2021-12-17 DIAGNOSIS — R42 DIZZINESS: ICD-10-CM

## 2021-12-17 DIAGNOSIS — E86.1 HYPOTENSION DUE TO HYPOVOLEMIA: ICD-10-CM

## 2021-12-17 DIAGNOSIS — I95.89 HYPOTENSION DUE TO HYPOVOLEMIA: ICD-10-CM

## 2021-12-17 DIAGNOSIS — I10 PRIMARY HYPERTENSION: Primary | ICD-10-CM

## 2021-12-17 DIAGNOSIS — E78.2 MIXED HYPERLIPIDEMIA: ICD-10-CM

## 2021-12-17 PROCEDURE — 99214 OFFICE O/P EST MOD 30 MIN: CPT | Performed by: INTERNAL MEDICINE

## 2022-05-13 ENCOUNTER — HOSPITAL ENCOUNTER (OUTPATIENT)
Dept: RADIOLOGY | Facility: HOSPITAL | Age: 67
Discharge: HOME | End: 2022-05-13
Attending: INTERNAL MEDICINE
Payer: COMMERCIAL

## 2022-05-13 DIAGNOSIS — E78.00 PRIMARY HYPERCHOLESTEROLEMIA: ICD-10-CM

## 2022-05-13 PROCEDURE — G1004 CDSM NDSC: HCPCS

## 2022-05-13 PROCEDURE — 75571 CT HRT W/O DYE W/CA TEST: CPT

## 2022-05-22 NOTE — RESULT ENCOUNTER NOTE
1  Calcium score percentile for age, race and gender(matched patients) 77 is 43%  42% of matched patient's have calcium scores lower than yours and 56% have calcium scores which are higher than yours  In other words your calcium score is slightly less than average (50%) of matched patients  2  The percentile of matched patients with a 0% calcium score is 21%, or 79% of matched patient's have a calcium score greater than 0%  3  The estimated 10 year risk of a coronary heart disease event is 13 8%  4  The estimated arterial age for a person with a calcium score of 66 is 70 years  5  The estimated [employer] 10 year hard coronary heart disease risk is 12% using actual age  10   The estimated 10 year hard coronary heart disease risk is 16% using arterial age

## 2022-06-18 PROBLEM — R93.1 AGATSTON CORONARY ARTERY CALCIUM SCORE LESS THAN 100: Status: ACTIVE | Noted: 2022-06-18

## 2022-06-20 ENCOUNTER — OFFICE VISIT (OUTPATIENT)
Dept: CARDIOLOGY CLINIC | Facility: CLINIC | Age: 67
End: 2022-06-20
Payer: MEDICARE

## 2022-06-20 VITALS
BODY MASS INDEX: 29.48 KG/M2 | HEART RATE: 68 BPM | SYSTOLIC BLOOD PRESSURE: 154 MMHG | HEIGHT: 73 IN | WEIGHT: 222.4 LBS | DIASTOLIC BLOOD PRESSURE: 82 MMHG

## 2022-06-20 DIAGNOSIS — I10 PRIMARY HYPERTENSION: ICD-10-CM

## 2022-06-20 DIAGNOSIS — E78.2 MIXED HYPERLIPIDEMIA: ICD-10-CM

## 2022-06-20 DIAGNOSIS — R93.1 AGATSTON CORONARY ARTERY CALCIUM SCORE LESS THAN 100: Primary | ICD-10-CM

## 2022-06-20 PROCEDURE — 99214 OFFICE O/P EST MOD 30 MIN: CPT | Performed by: INTERNAL MEDICINE

## 2022-06-20 PROCEDURE — 93000 ELECTROCARDIOGRAM COMPLETE: CPT | Performed by: INTERNAL MEDICINE

## 2022-06-20 RX ORDER — SILDENAFIL 25 MG/1
TABLET, FILM COATED ORAL
COMMUNITY
Start: 2022-05-02

## 2022-06-20 NOTE — PROGRESS NOTES
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  POCT ECG   2  Mixed hyperlipidemia     3  Primary hypertension       ACTIVE PROBLEM LIST  Patient Active Problem List   Diagnosis    Hypertension    Mixed hyperlipidemia    Benign prostatic hyperplasia with urinary obstruction    Personal history of kidney stones    Hypotension due to hypovolemia    Dizziness    Agatston coronary artery calcium score less than 100       CARDIOLOGY SPECIALTY COMMENTS  Patient was originally seen in my old office for treatment of hypertension and hyperlipidemia  Patient Debbie Mcfarlane in ED at [address] [place] [address] [address] on  for dizziness and difficulty walking  MRI/ MRA of the brain and neck were normal  Cholesterol elevated, ASCVD risk greater than 10%, consider starting statin   Blood pressure 142/80, pulse 58  Patient seen in the ED at [facility] on  10/22/2021  Blood pressure 123/60, pulse 64  Patient had total right knee replacement on 10/07/2021  Patient on oxycodone  Pt with episode of dizzy, pale and hypotension at home a couple of hours ago  SBP 80 at home   Patient vomited twice in the afternoon   Of note he  recently restarted his alfuzosin  which he had stop because it made him was affective in leaving the aiding his BPH symptoms   On examination patient appeared fatigued and dehydrated  Labs did not confirm dehydration but he may have already received fluids      2020 [facility] lipid panel:  Total cholesterol 202, triglycerides 126, HDL 42, LDL calculated 135, non HDL cholesterol 160    06/04/2021 [facility] lipid profile cholesterol 214, triglycerides 135, HDL 45, LDL calculated 142, non HDL cholesterol 169  09/08/2021 [facility] lipid profile: Cholesterol 212, triglycerides 145, HDL 43, LDL calculated 140, non HDL cholesterol 169    INTERVAL HISTORY:        Patient is having no cardiac symptoms  Patient denies chest discomfort or shortness of breath  Patient has no palpitations  Patient denies symptoms of dizziness, lightheadedness or near-syncope/syncope  Patient denies leg edema  Patient denies symptoms of orthopnea or paroxysmal nocturnal dyspnea  Reviewed patient's calcium score with him and interpreted the meaning  DISCUSSION/PLAN:          Recommended patient take a statin to lower his cholesterol  However, he does not want to start another pill  He is considering waiting to years and then having his calcium score repeated and see if it has gotten any worse  Return in 6 months    Lab Studies:      Lab Results   Component Value Date    HGBA1C 5 8 (H) 09/08/2021      Results for orders placed or performed in visit on 06/20/22   POCT ECG    Narrative    Normal sinus rhythm at a rate of 68 beats per minute  Normal tracing           Current Outpatient Medications:     olmesartan (BENICAR) 20 mg tablet, TAKE ONE TABLET BY MOUTH TWICE DAILY, Disp: 180 tablet, Rfl: 3    RABEprazole (ACIPHEX) 20 MG tablet, every 24 hours, Disp: , Rfl:     sildenafil (VIAGRA) 25 MG tablet, TAKE ONE TABLET BY MOUTH DAILY AS NEEDED FOR ERECTILE DYSFUNCTION (Patient not taking: No sig reported), Disp: , Rfl:   No Known Allergies    Past Medical History:   Diagnosis Date    GERD (gastroesophageal reflux disease)     Hypertension      Social History     Socioeconomic History    Marital status: /Civil Union     Spouse name: Not on file    Number of children: Not on file    Years of education: Not on file    Highest education level: Not on file   Occupational History    Not on file   Tobacco Use    Smoking status: Never Smoker    Smokeless tobacco: Never Used   Vaping Use    Vaping Use: Never used   Substance and Sexual Activity    Alcohol use: Not on file     Comment: socially    Drug use: Never    Sexual activity: Not on file   Other Topics Concern    Not on file   Social History Narrative    Not on file     Social Determinants of Health     Financial Resource Strain: Not on file   Food Insecurity: Not on file   Transportation Needs: Not on file   Physical Activity: Not on file   Stress: Not on file   Social Connections: Not on file   Intimate Partner Violence: Not on file   Housing Stability: Not on file      Family History   Problem Relation Age of Onset    Coronary artery disease Mother     Lung cancer Father      Past Surgical History:   Procedure Laterality Date    APPENDECTOMY      At age 25  PREVIOUS WEIGHTS:   Wt Readings from Last 10 Encounters:   06/20/22 101 kg (222 lb 6 4 oz)   12/17/21 98 4 kg (217 lb)   10/27/21 95 7 kg (211 lb)   08/25/21 100 kg (221 lb)   06/28/21 99 8 kg (220 lb)   06/23/21 101 kg (222 lb)   11/30/20 97 1 kg (214 lb)   06/08/20 96 2 kg (212 lb)   06/24/19 104 kg (228 lb 12 8 oz)   05/11/19 105 kg (231 lb)        Review of Systems:  Review of Systems   Constitutional: Negative for activity change  Respiratory: Negative for cough, chest tightness, shortness of breath and wheezing  Cardiovascular: Negative for chest pain, palpitations and leg swelling  Musculoskeletal: Negative for gait problem  Skin: Negative for color change  Neurological: Negative for dizziness, tremors, syncope, weakness, light-headedness and headaches  Psychiatric/Behavioral: Negative for agitation and confusion  Physical Exam:  /82   Pulse 68   Ht 6' 1" (1 854 m)   Wt 101 kg (222 lb 6 4 oz)   BMI 29 34 kg/m²     Physical Exam  Vitals reviewed     Constitutional:       General: He is not in acute distress  Appearance: He is well-developed  HENT:      Head: Normocephalic and atraumatic  Neck:      Thyroid: No thyromegaly  Vascular: No carotid bruit or JVD  Trachea: No tracheal deviation  Cardiovascular:      Rate and Rhythm: Normal rate and regular rhythm  Pulses: Normal pulses  Heart sounds: Normal heart sounds  No murmur heard  No friction rub  No gallop  Pulmonary:      Effort: Pulmonary effort is normal  No respiratory distress  Breath sounds: Normal breath sounds  No wheezing, rhonchi or rales  Chest:      Chest wall: No tenderness  Musculoskeletal:      Cervical back: Normal range of motion and neck supple  Right lower leg: No edema  Left lower leg: No edema  Skin:     General: Skin is warm and dry  Neurological:      General: No focal deficit present  Mental Status: He is alert and oriented to person, place, and time  Psychiatric:         Mood and Affect: Mood normal          Behavior: Behavior normal          Thought Content: Thought content normal          Judgment: Judgment normal      ======================================================  Imaging:   I have personally reviewed pertinent reports  Portions of the record may have been created with voice recognition software  Occasional wrong word or "sound a like" substitutions may have occurred due to the inherent limitations of voice recognition software  Read the chart carefully and recognize, using context, where substitutions have occurred      SIGNATURES:   Alisha Stephens MD

## 2022-12-06 ENCOUNTER — OFFICE VISIT (OUTPATIENT)
Dept: CARDIOLOGY CLINIC | Facility: CLINIC | Age: 67
End: 2022-12-06

## 2022-12-06 VITALS
DIASTOLIC BLOOD PRESSURE: 82 MMHG | BODY MASS INDEX: 29.74 KG/M2 | HEART RATE: 58 BPM | SYSTOLIC BLOOD PRESSURE: 140 MMHG | WEIGHT: 225.4 LBS

## 2022-12-06 DIAGNOSIS — N40.1 BENIGN PROSTATIC HYPERPLASIA WITH WEAK URINARY STREAM: ICD-10-CM

## 2022-12-06 DIAGNOSIS — E78.2 MIXED HYPERLIPIDEMIA: ICD-10-CM

## 2022-12-06 DIAGNOSIS — R39.12 BENIGN PROSTATIC HYPERPLASIA WITH WEAK URINARY STREAM: ICD-10-CM

## 2022-12-06 DIAGNOSIS — Z12.5 SCREENING FOR PROSTATE CANCER: ICD-10-CM

## 2022-12-06 DIAGNOSIS — N40.0 ENLARGED PROSTATE: ICD-10-CM

## 2022-12-06 DIAGNOSIS — I10 PRIMARY HYPERTENSION: ICD-10-CM

## 2022-12-06 DIAGNOSIS — R93.1 AGATSTON CORONARY ARTERY CALCIUM SCORE LESS THAN 100: Primary | ICD-10-CM

## 2022-12-06 RX ORDER — ASPIRIN 81 MG/1
81 TABLET ORAL DAILY
COMMUNITY

## 2022-12-06 RX ORDER — AMOXICILLIN 500 MG/1
500 CAPSULE ORAL EVERY 8 HOURS SCHEDULED
COMMUNITY

## 2022-12-06 NOTE — PROGRESS NOTES
[facility]  [address]re [address] [facility]   [address]  [id number]  Phone#  [phone]   Fax#  [phone]  *-*-*-*-*-*-*-*-*-*-*-*-*-*-*-*-*-*-*-*-*-*-*-*-*-*-*-*-*-*-*-*-*-*-*-*-*-*-*-*-*-*-*-*-*-*-*-*-*-*-*-*-*-*                                   Cardiology Follow Up      ENCOUNTER DATE: 22 4:12 PM  PATIENT NAME: Scarlett Son   : 1955    MRN: [id number]  AGE:67 y o  SEX: male  [address] Ashley Kelly MD     PRIMARY CARE PHYSICIAN: Chris Valentino MD    ACTIVE DIAGNOSIS THIS VISIT  1  Agatston coronary artery calcium score less than 100        2  Primary hypertension        3  Mixed hyperlipidemia          ACTIVE PROBLEM LIST  Patient Active Problem List   Diagnosis   • Hypertension   • Mixed hyperlipidemia   • Benign prostatic hyperplasia with urinary obstruction   • Personal history of kidney stones   • Hypotension due to hypovolemia   • Dizziness   • Agatston coronary artery calcium score less than 100       CARDIOLOGY SPECIALTY COMMENTS  Patient was originally seen in my old office for treatment of hypertension and hyperlipidemia  Patient Aristeo Dutton in ED at [facility] on  for dizziness and difficulty walking  MRI/ MRA of the brain and neck were normal  Cholesterol elevated, ASCVD risk greater than 10%, consider starting statin   Blood pressure 142/80, pulse 58  Patient seen in the ED at [facility] on  10/22/2021  Blood pressure 123/60, pulse 64  Patient had total right knee replacement on 10/07/2021  Patient on oxycodone  Pt with episode of dizzy, pale and hypotension at home a couple of hours ago  SBP 80 at home   Patient vomited twice in the afternoon   Of note he  recently restarted his alfuzosin  which he had stop because it made him was affective in leaving the aiding his BPH symptoms   On examination patient appeared fatigued and dehydrated  Labs did not confirm dehydration but he may have already received fluids      2022 CT calcium score 66    05/29/2020 [facility] lipid panel:  Total cholesterol 202, triglycerides 126, HDL 42, LDL calculated 135, non HDL cholesterol 160    06/04/2021 [facility] lipid profile cholesterol 214, triglycerides 135, HDL 45, LDL calculated 142, non HDL cholesterol 169  09/08/2021 [facility] lipid profile: Cholesterol 212, triglycerides 145, HDL 43, LDL calculated 140, non HDL cholesterol 169    INTERVAL HISTORY:        Has no cardiac complaints  Patient denies chest discomfort or shortness of breath  Patient has no palpitations  Patient denies symptoms of dizziness, lightheadedness or near-syncope/syncope  Patient denies leg edema  Patient denies symptoms of orthopnea or paroxysmal nocturnal dyspnea  States that his home blood pressure which is 128/80  He has not seen his PCP in quite some time and has not had any recent lab work  DISCUSSION/PLAN:          · Obtain CBC, CMP, fasting lipid profile and PSA  · Return in 6 months    No results found for this visit on 12/06/22        Current Outpatient Medications:   •  amoxicillin (AMOXIL) 500 mg capsule, Take 500 mg by mouth every 8 (eight) hours, Disp: , Rfl:   •  aspirin (ECOTRIN LOW STRENGTH) 81 mg EC tablet, Take 81 mg by mouth daily, Disp: , Rfl:   •  Doxylamine-DM-GG (ROBITUSSIN DM MAX DAY/NIGHT PO), Take by mouth every 4 (four) hours as needed, Disp: , Rfl:   •  olmesartan (BENICAR) 20 mg tablet, TAKE ONE TABLET BY MOUTH TWICE DAILY, Disp: 180 tablet, Rfl: 3  •  RABEprazole (ACIPHEX) 20 MG tablet, every 24 hours, Disp: , Rfl:   •  sildenafil (VIAGRA) 25 MG tablet, TAKE ONE TABLET BY MOUTH DAILY AS NEEDED FOR ERECTILE DYSFUNCTION (Patient not taking: Reported on 6/20/2022), Disp: , Rfl:   No Known Allergies    Past Medical History:   Diagnosis Date   • GERD (gastroesophageal reflux disease)    • Hypertension      Social History     Socioeconomic History   • Marital status: /Civil Union     Spouse name: Not on file   • Number of children: Not on file   • Years of education: Not on file   • Highest education level: Not on file   Occupational History   • Not on file   Tobacco Use   • Smoking status: Never   • Smokeless tobacco: Never   Vaping Use   • Vaping Use: Never used   Substance and Sexual Activity   • Alcohol use: Not on file     Comment: socially   • Drug use: Never   • Sexual activity: Not on file   Other Topics Concern   • Not on file   Social History Narrative   • Not on file     Social Determinants of Health     Financial Resource Strain: Not on file   Food Insecurity: Not on file   Transportation Needs: Not on file   Physical Activity: Not on file   Stress: Not on file   Social Connections: Not on file   Intimate Partner Violence: Not on file   Housing Stability: Not on file      Family History   Problem Relation Age of Onset   • Coronary artery disease Mother    • Lung cancer Father      Past Surgical History:   Procedure Laterality Date   • APPENDECTOMY      At age 25  PREVIOUS WEIGHTS:   Wt Readings from Last 10 Encounters:   12/06/22 102 kg (225 lb 6 4 oz)   06/20/22 101 kg (222 lb 6 4 oz)   12/17/21 98 4 kg (217 lb)   10/27/21 95 7 kg (211 lb)   08/25/21 100 kg (221 lb)   06/28/21 99 8 kg (220 lb)   06/23/21 101 kg (222 lb)   11/30/20 97 1 kg (214 lb)   06/08/20 96 2 kg (212 lb)   06/24/19 104 kg (228 lb 12 8 oz)        Review of Systems:  Review of Systems   Constitutional: Negative for activity change  Respiratory: Negative for cough, chest tightness, shortness of breath and wheezing  Cardiovascular: Negative for chest pain, palpitations and leg swelling  Musculoskeletal: Negative for gait problem  Skin: Negative for color change  Neurological: Negative for dizziness, tremors, syncope, weakness, light-headedness and headaches  Psychiatric/Behavioral: Negative for agitation and confusion         Physical Exam:  /82 (BP Location: Left arm, Patient Position: Sitting, Cuff Size: Large)   Pulse 58   Wt 102 kg (225 lb 6 4 oz)   BMI 29 74 kg/m² Physical Exam  Vitals reviewed  Constitutional:       General: He is not in acute distress  Appearance: He is well-developed  HENT:      Head: Normocephalic and atraumatic  Neck:      Thyroid: No thyromegaly  Vascular: No carotid bruit or JVD  Trachea: No tracheal deviation  Cardiovascular:      Rate and Rhythm: Normal rate and regular rhythm  Pulses: Normal pulses  Heart sounds: Normal heart sounds  No murmur heard  No friction rub  No gallop  Pulmonary:      Effort: Pulmonary effort is normal  No respiratory distress  Breath sounds: Normal breath sounds  No wheezing, rhonchi or rales  Chest:      Chest wall: No tenderness  Musculoskeletal:      Cervical back: Normal range of motion and neck supple  Right lower leg: No edema  Left lower leg: No edema  Skin:     General: Skin is warm and dry  Neurological:      General: No focal deficit present  Mental Status: He is alert and oriented to person, place, and time  Psychiatric:         Mood and Affect: Mood normal          Behavior: Behavior normal          Thought Content: Thought content normal          Judgment: Judgment normal        ======================================================  Imaging:   I have personally reviewed pertinent reports  Portions of the record may have been created with voice recognition software  Occasional wrong word or "sound a like" substitutions may have occurred due to the inherent limitations of voice recognition software  Read the chart carefully and recognize, using context, where substitutions have occurred      SIGNATURES:   Catrina Mathur MD

## 2022-12-09 DIAGNOSIS — R74.8 ELEVATED LIVER ENZYMES: Primary | ICD-10-CM

## 2023-06-06 PROBLEM — R42 DIZZINESS: Status: RESOLVED | Noted: 2021-10-24 | Resolved: 2023-06-06

## 2023-06-07 ENCOUNTER — OFFICE VISIT (OUTPATIENT)
Dept: CARDIOLOGY CLINIC | Facility: CLINIC | Age: 68
End: 2023-06-07
Payer: MEDICARE

## 2023-06-07 VITALS
HEIGHT: 73 IN | WEIGHT: 225.6 LBS | SYSTOLIC BLOOD PRESSURE: 152 MMHG | HEART RATE: 55 BPM | BODY MASS INDEX: 29.9 KG/M2 | DIASTOLIC BLOOD PRESSURE: 90 MMHG

## 2023-06-07 DIAGNOSIS — I10 PRIMARY HYPERTENSION: ICD-10-CM

## 2023-06-07 DIAGNOSIS — R93.1 AGATSTON CORONARY ARTERY CALCIUM SCORE LESS THAN 100: Primary | ICD-10-CM

## 2023-06-07 DIAGNOSIS — E78.2 MIXED HYPERLIPIDEMIA: ICD-10-CM

## 2023-06-07 PROCEDURE — 93000 ELECTROCARDIOGRAM COMPLETE: CPT | Performed by: INTERNAL MEDICINE

## 2023-06-07 PROCEDURE — 99214 OFFICE O/P EST MOD 30 MIN: CPT | Performed by: INTERNAL MEDICINE

## 2023-06-07 NOTE — PROGRESS NOTES
[facility]  Anand [address] [facility], [place] [address] [facility]  Phone#  [phone]   Fax#  [phone]  *-*-*-*-*-*-*-*-*-*-*-*-*-*-*-*-*-*-*-*-*-*-*-*-*-*-*-*-*-*-*-*-*-*-*-*-*-*-*-*-*-*-*-*-*-*-*-*-*-*-*-*-*-*                                   Cardiology Follow Up      ENCOUNTER DATE: 23 11:11 AM  PATIENT NAME: Mari Abdullahi   : 1955    MRN: [id number]  AGE:68 y o  SEX: male  [address] Ashley Kelly MD     PRIMARY CARE PHYSICIAN: Sergio Dozier MD    ACTIVE DIAGNOSIS THIS VISIT  1  Agatston coronary artery calcium score less than 100        2  Primary hypertension  POCT ECG      3  Mixed hyperlipidemia          ACTIVE PROBLEM LIST  Patient Active Problem List   Diagnosis   • Hypertension   • Mixed hyperlipidemia   • Benign prostatic hyperplasia with urinary obstruction   • Personal history of kidney stones   • Hypotension due to hypovolemia   • Agatston coronary artery calcium score less than 100       CARDIOLOGY SPECIALTY COMMENTS  Patient was originally seen in my old office for treatment of hypertension and hyperlipidemia  Patient Denise Macario in ED at [address] [place] [address] on  for dizziness and difficulty walking  MRI/ MRA of the brain and neck were normal  Cholesterol elevated, ASCVD risk greater than 10%, consider starting statin   Blood pressure 142/80, pulse 58  Patient seen in the ED at [facility] on  10/22/2021  Blood pressure 123/60, pulse 64  Patient had total right knee replacement on 10/07/2021  Patient on oxycodone  Pt with episode of dizzy, pale and hypotension at home a couple of hours ago  SBP 80 at home   Patient vomited twice in the afternoon   Of note he  recently restarted his alfuzosin  which he had stop because it made him was affective in leaving the aiding his BPH symptoms   On examination patient appeared fatigued and dehydrated  Labs did not confirm dehydration but he may have already received fluids      2022 CT calcium score 66    2020 [facility] lipid panel:  Total cholesterol 202, triglycerides 126, HDL 42, LDL calculated 135, non HDL cholesterol 160    06/04/2021 [facility] lipid profile cholesterol 214, triglycerides 135, HDL 45, LDL calculated 142, non HDL cholesterol 169  09/08/2021 [facility] lipid profile: Cholesterol 212, triglycerides 145, HDL 43, LDL calculated 140, non HDL cholesterol 169  12/7/2022 [facility] lipid profile: Cholesterol 167, triglycerides 110, HDL 33, LDL calculated 112, non-HDL cholesterol 134    INTERVAL HISTORY:        Patient returns  He is having no symptoms  He mainly sees me for hypertension  His blood pressure is high today  He states that he was rushing on the way to the office because he was on the phone and was late  His blood pressure was repeated by myself and was 140/88  He denies chest pain or shortness of breath  DISCUSSION/PLAN:          Return in 5 months before he goes back to [place]    Lab Studies:      Lab Results   Component Value Date    HGBA1C 5 8 (H) 09/08/2021        Results for orders placed or performed in visit on 06/07/23   POCT ECG    Narrative    Sinus bradycardia at a rate of 55 bpm   Normal EKG           Current Outpatient Medications:   •  aspirin (ECOTRIN LOW STRENGTH) 81 mg EC tablet, Take 81 mg by mouth daily, Disp: , Rfl:   •  olmesartan (BENICAR) 20 mg tablet, TAKE ONE TABLET BY MOUTH TWICE DAILY, Disp: 180 tablet, Rfl: 3  •  RABEprazole (ACIPHEX) 20 MG tablet, every 24 hours, Disp: , Rfl:   •  amoxicillin (AMOXIL) 500 mg capsule, Take 500 mg by mouth every 8 (eight) hours (Patient not taking: Reported on 6/7/2023), Disp: , Rfl:   •  Doxylamine-DM-GG (ROBITUSSIN DM MAX DAY/NIGHT PO), Take by mouth every 4 (four) hours as needed (Patient not taking: Reported on 6/7/2023), Disp: , Rfl:   •  sildenafil (VIAGRA) 25 MG tablet, TAKE ONE TABLET BY MOUTH DAILY AS NEEDED FOR ERECTILE DYSFUNCTION (Patient not taking: Reported on 6/20/2022), Disp: , Rfl:   Allergies   Allergen Reactions   • Doxycycline Vomiting       Past Medical History:   Diagnosis Date   • GERD (gastroesophageal reflux disease)    • Hypertension      Social History     Socioeconomic History   • Marital status: /Civil Union     Spouse name: Not on file   • Number of children: Not on file   • Years of education: Not on file   • Highest education level: Not on file   Occupational History   • Not on file   Tobacco Use   • Smoking status: Never   • Smokeless tobacco: Never   Vaping Use   • Vaping Use: Never used   Substance and Sexual Activity   • Alcohol use: Not on file     Comment: socially   • Drug use: Never   • Sexual activity: Not on file   Other Topics Concern   • Not on file   Social History Narrative   • Not on file     Social Determinants of Health     Financial Resource Strain: Not on file   Food Insecurity: Not on file   Transportation Needs: Not on file   Physical Activity: Not on file   Stress: Not on file   Social Connections: Not on file   Intimate Partner Violence: Not on file   Housing Stability: Not on file      Family History   Problem Relation Age of Onset   • Coronary artery disease Mother    • Lung cancer Father      Past Surgical History:   Procedure Laterality Date   • APPENDECTOMY      At age 25  PREVIOUS WEIGHTS:   Wt Readings from Last 10 Encounters:   06/07/23 102 kg (225 lb 9 6 oz)   12/06/22 102 kg (225 lb 6 4 oz)   06/20/22 101 kg (222 lb 6 4 oz)   12/17/21 98 4 kg (217 lb)   10/27/21 95 7 kg (211 lb)   08/25/21 100 kg (221 lb)   06/28/21 99 8 kg (220 lb)   06/23/21 101 kg (222 lb)   11/30/20 97 1 kg (214 lb)   06/08/20 96 2 kg (212 lb)        Review of Systems:  Review of Systems   Constitutional: Negative for activity change  Respiratory: Negative for cough, chest tightness, shortness of breath and wheezing  Cardiovascular: Negative for chest pain, palpitations and leg swelling  Musculoskeletal: Negative for gait problem  Skin: Negative for color change     Neurological: Negative for dizziness, "tremors, syncope, weakness, light-headedness and headaches  Psychiatric/Behavioral: Negative for agitation and confusion  Physical Exam:  /90 (BP Location: Right arm, Patient Position: Sitting, Cuff Size: Large)   Pulse 55   Ht 6' 1\" (1 854 m)   Wt 102 kg (225 lb 9 6 oz)   BMI 29 76 kg/m²     Physical Exam  Vitals reviewed  Constitutional:       General: He is not in acute distress  Appearance: He is well-developed  HENT:      Head: Normocephalic and atraumatic  Neck:      Thyroid: No thyromegaly  Vascular: No carotid bruit or JVD  Trachea: No tracheal deviation  Cardiovascular:      Rate and Rhythm: Normal rate and regular rhythm  Pulses: Normal pulses  Heart sounds: Normal heart sounds  No murmur heard  No friction rub  No gallop  Pulmonary:      Effort: Pulmonary effort is normal  No respiratory distress  Breath sounds: Normal breath sounds  No wheezing, rhonchi or rales  Chest:      Chest wall: No tenderness  Musculoskeletal:      Cervical back: Normal range of motion and neck supple  Right lower leg: No edema  Left lower leg: No edema  Skin:     General: Skin is warm and dry  Neurological:      General: No focal deficit present  Mental Status: He is alert and oriented to person, place, and time  Psychiatric:         Mood and Affect: Mood normal          Behavior: Behavior normal          Thought Content: Thought content normal          Judgment: Judgment normal      ======================================================  Imaging:   I have personally reviewed pertinent reports  I spent 30 minutes on the patient's office visit  This time was spent on the day of the visit  I had direct contact with the patient in the office on the day of the visit   Greater than 50% of the total time was spent obtaining a history, examining patient, answering all patient questions, arranging and discussing plan of care with patient as well as " "directly providing instructions  Additional time then spent on orders and office chart  Portions of the record may have been created with voice recognition software  Occasional wrong word or \"sound a like\" substitutions may have occurred due to the inherent limitations of voice recognition software  Read the chart carefully and recognize, using context, where substitutions have occurred      SIGNATURES:   Timoteo Rodriguez MD   "

## 2023-11-16 ENCOUNTER — OFFICE VISIT (OUTPATIENT)
Dept: CARDIOLOGY CLINIC | Facility: CLINIC | Age: 68
End: 2023-11-16
Payer: MEDICARE

## 2023-11-16 ENCOUNTER — APPOINTMENT (OUTPATIENT)
Dept: LAB | Facility: HOSPITAL | Age: 68
End: 2023-11-16
Payer: MEDICARE

## 2023-11-16 VITALS
DIASTOLIC BLOOD PRESSURE: 84 MMHG | WEIGHT: 227.6 LBS | HEART RATE: 81 BPM | BODY MASS INDEX: 30.03 KG/M2 | SYSTOLIC BLOOD PRESSURE: 122 MMHG

## 2023-11-16 DIAGNOSIS — E78.2 MIXED HYPERLIPIDEMIA: ICD-10-CM

## 2023-11-16 DIAGNOSIS — I10 PRIMARY HYPERTENSION: ICD-10-CM

## 2023-11-16 DIAGNOSIS — G44.039 EPISODIC PAROXYSMAL HEMICRANIA, NOT INTRACTABLE: ICD-10-CM

## 2023-11-16 DIAGNOSIS — R93.1 AGATSTON CORONARY ARTERY CALCIUM SCORE LESS THAN 100: Primary | ICD-10-CM

## 2023-11-16 LAB
ALBUMIN SERPL BCP-MCNC: 4.3 G/DL (ref 3.5–5)
ALP SERPL-CCNC: 92 U/L (ref 34–104)
ALT SERPL W P-5'-P-CCNC: 17 U/L (ref 7–52)
ANION GAP SERPL CALCULATED.3IONS-SCNC: 6 MMOL/L
AST SERPL W P-5'-P-CCNC: 17 U/L (ref 13–39)
BILIRUB SERPL-MCNC: 0.42 MG/DL (ref 0.2–1)
BUN SERPL-MCNC: 15 MG/DL (ref 5–25)
CALCIUM SERPL-MCNC: 9.2 MG/DL (ref 8.4–10.2)
CHLORIDE SERPL-SCNC: 106 MMOL/L (ref 96–108)
CO2 SERPL-SCNC: 27 MMOL/L (ref 21–32)
CREAT SERPL-MCNC: 1.05 MG/DL (ref 0.6–1.3)
ERYTHROCYTE [SEDIMENTATION RATE] IN BLOOD: 13 MM/HOUR (ref 0–19)
GFR SERPL CREATININE-BSD FRML MDRD: 72 ML/MIN/1.73SQ M
GLUCOSE SERPL-MCNC: 83 MG/DL (ref 65–140)
POTASSIUM SERPL-SCNC: 3.6 MMOL/L (ref 3.5–5.3)
PROT SERPL-MCNC: 6.9 G/DL (ref 6.4–8.4)
PSA SERPL-MCNC: 2.82 NG/ML (ref 0–4)
SODIUM SERPL-SCNC: 139 MMOL/L (ref 135–147)

## 2023-11-16 PROCEDURE — 99215 OFFICE O/P EST HI 40 MIN: CPT | Performed by: INTERNAL MEDICINE

## 2023-11-16 PROCEDURE — 85652 RBC SED RATE AUTOMATED: CPT

## 2023-11-16 NOTE — PROGRESS NOTES
[facility]  [address] [facility] [address]  Phone#  [phone]   Fax#  [phone]  *-*-*-*-*-*-*-*-*-*-*-*-*-*-*-*-*-*-*-*-*-*-*-*-*-*-*-*-*-*-*-*-*-*-*-*-*-*-*-*-*-*-*-*-*-*-*-*-*-*-*-*-*-*                                   Cardiology Follow Up      ENCOUNTER DATE: 23 2:09 PM  PATIENT NAME: Emmanuel Nolasco   : 1955    MRN: [id number]  AGE:68 y.o. SEX: male  [address], [place]     PRIMARY CARE PHYSICIAN: Kourtney Heller MD    ACTIVE DIAGNOSIS THIS VISIT  1. 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 Mixed hyperlipidemia        3. Primary hypertension        4. Episodic paroxysmal hemicrania, not intractable  Basic metabolic panel    Sedimentation rate, automated    CTA head and neck w wo contrast        ACTIVE PROBLEM LIST  Patient Active Problem List   Diagnosis    Hypertension    Mixed hyperlipidemia    Benign prostatic hyperplasia with urinary obstruction    Personal history of kidney stones    Hypotension due to hypovolemia    Agatston coronary artery calcium score less than 100       CARDIOLOGY SPECIALTY COMMENTS  Patient was originally seen in my old office for treatment of hypertension and hyperlipidemia. Patient  seen in ED at [address] [place] [address] on  for dizziness and difficulty walking. MRI/ MRA of the brain and neck were normal. Cholesterol elevated, ASCVD risk greater than 10%, consider starting statin. Blood pressure 142/80, pulse 58. Patient seen in the ED at [facility] on  10/22/2021. Blood pressure 123/60, pulse 64. Patient had total right knee replacement on 10/07/2021. Patient on oxycodone. Pt with episode of dizzy, pale and hypotension at home a couple of hours ago. SBP 80 at home. Patient vomited twice in the afternoon. Of note he  recently restarted his alfuzosin  which he had stop because it made him was affective in leaving the aiding his BPH symptoms.   On examination patient appeared fatigued and dehydrated. Labs did not confirm dehydration but he may have already received fluids. 05/13/2022 CT calcium score 66    05/29/2020 [facility] lipid panel: Total cholesterol 202, triglycerides 126, HDL 42, LDL calculated 135, non HDL cholesterol 160    06/04/2021 [facility] lipid profile cholesterol 214, triglycerides 135, HDL 45, LDL calculated 142, non HDL cholesterol 169  09/08/2021 [facility] lipid profile: Cholesterol 212, triglycerides 145, HDL 43, LDL calculated 140, non HDL cholesterol 169  12/7/2022 [facility] lipid profile: Cholesterol 167, triglycerides 110, HDL 33, LDL calculated 112, non-HDL cholesterol 134    INTERVAL HISTORY:        Patient with a history of hypertension and mild mixed upper lipidemia returns. He is has been having headaches on the low side of his head only at times fairly severe. When he has these headaches, he takes his blood pressure and he finds that it is always elevated usually in the range of 160/90. When he takes his blood pressure without a headache, it is usually around 687D to 488 systolic. He has had legs are new occurrence which she has never had in the past.    DISCUSSION/PLAN:          A nonfasting BMP, sed rate  Obtain a CTA of the head and neck  Return on December 7 at 10:40 AM    Lab Studies:      Lab Results   Component Value Date    HGBA1C 5.8 (H) 09/08/2021        No results found for this visit on 11/16/23.       Current Outpatient Medications:     olmesartan (BENICAR) 20 mg tablet, TAKE ONE TABLET BY MOUTH TWICE DAILY, Disp: 180 tablet, Rfl: 3    RABEprazole (ACIPHEX) 20 MG tablet, every 24 hours, Disp: , Rfl:   Allergies   Allergen Reactions    Doxycycline Vomiting       Past Medical History:   Diagnosis Date    GERD (gastroesophageal reflux disease)     Hypertension      Social History     Socioeconomic History    Marital status: /Civil Union     Spouse name: Not on file    Number of children: Not on file    Years of education: Not on file    Highest education level: Not on file   Occupational History    Not on file   Tobacco Use    Smoking status: Never    Smokeless tobacco: Never   Vaping Use    Vaping Use: Never used   Substance and Sexual Activity    Alcohol use: Not on file     Comment: socially    Drug use: Never    Sexual activity: Not on file   Other Topics Concern    Not on file   Social History Narrative    Not on file     Social Determinants of Health     Financial Resource Strain: Not on file   Food Insecurity: Not on file   Transportation Needs: Not on file   Physical Activity: Not on file   Stress: Not on file   Social Connections: Not on file   Intimate Partner Violence: Not on file   Housing Stability: Not on file      Family History   Problem Relation Age of Onset    Coronary artery disease Mother     Lung cancer Father      Past Surgical History:   Procedure Laterality Date    APPENDECTOMY      At age 25. PREVIOUS WEIGHTS:   Wt Readings from Last 10 Encounters:   11/16/23 103 kg (227 lb 9.6 oz)   06/07/23 102 kg (225 lb 9.6 oz)   12/06/22 102 kg (225 lb 6.4 oz)   06/20/22 101 kg (222 lb 6.4 oz)   12/17/21 98.4 kg (217 lb)   10/27/21 95.7 kg (211 lb)   08/25/21 100 kg (221 lb)   06/28/21 99.8 kg (220 lb)   06/23/21 101 kg (222 lb)   11/30/20 97.1 kg (214 lb)        Review of Systems:  Review of Systems   Constitutional:  Negative for activity change. Respiratory:  Negative for cough, choking, chest tightness, shortness of breath, wheezing and stridor. Cardiovascular:  Negative for chest pain, palpitations and leg swelling. Musculoskeletal:  Negative for gait problem. Skin:  Negative for color change. Neurological:  Positive for headaches. Negative for dizziness, tremors, syncope, weakness and light-headedness. Psychiatric/Behavioral:  Negative for agitation and confusion.         Physical Exam:  /84 (BP Location: Left arm, Patient Position: Sitting, Cuff Size: Large)   Pulse 81   Wt 103 kg (227 lb 9.6 oz)   BMI 30.03 kg/m²     Physical Exam  Vitals reviewed. Constitutional:       General: He is not in acute distress. Appearance: He is well-developed. HENT:      Head: Normocephalic and atraumatic. Neck:      Thyroid: No thyromegaly. Vascular: No carotid bruit or JVD. Trachea: No tracheal deviation. Cardiovascular:      Rate and Rhythm: Normal rate and regular rhythm. Pulses: Normal pulses. Heart sounds: Normal heart sounds. No murmur heard. No friction rub. No gallop. Pulmonary:      Effort: Pulmonary effort is normal. No respiratory distress. Breath sounds: Normal breath sounds. No wheezing, rhonchi or rales. Chest:      Chest wall: No tenderness. Musculoskeletal:      Cervical back: Normal range of motion and neck supple. Right lower leg: No edema. Left lower leg: No edema. Skin:     General: Skin is warm and dry. Neurological:      General: No focal deficit present. Mental Status: He is alert and oriented to person, place, and time. Psychiatric:         Mood and Affect: Mood normal.         Behavior: Behavior normal.         Thought Content: Thought content normal.         Judgment: Judgment normal.         ======================================================  Imaging:   I have personally reviewed pertinent reports. I spent 50 minutes on the patient's office visit. This time was spent on the day of the visit. I had direct contact with the patient in the office on the day of the visit. Greater than 50% of the total time was spent obtaining a history, examining patient, answering all patient questions, arranging and discussing plan of care with patient as well as directly providing instructions. Additional time then spent on orders and office chart. Portions of the record may have been created with voice recognition software. Occasional wrong word or "sound a like" substitutions may have occurred due to the inherent limitations of voice recognition software.  Read the chart carefully and recognize, using context, where substitutions have occurred.     SIGNATURES:   Kwame Carter MD

## 2023-11-30 ENCOUNTER — HOSPITAL ENCOUNTER (OUTPATIENT)
Dept: CT IMAGING | Facility: HOSPITAL | Age: 68
Discharge: HOME | End: 2023-11-30
Attending: INTERNAL MEDICINE
Payer: MEDICARE

## 2023-11-30 DIAGNOSIS — G44.039 EPISODIC PAROXYSMAL HEMICRANIA, NOT INTRACTABLE: ICD-10-CM

## 2023-11-30 PROCEDURE — G1004 CDSM NDSC: HCPCS

## 2023-11-30 PROCEDURE — 70496 CT ANGIOGRAPHY HEAD: CPT

## 2023-11-30 PROCEDURE — 70498 CT ANGIOGRAPHY NECK: CPT

## 2023-11-30 RX ADMIN — IOHEXOL 85 ML: 350 INJECTION, SOLUTION INTRAVENOUS at 16:05

## 2023-12-06 DIAGNOSIS — J01.30 SUBACUTE SPHENOIDAL SINUSITIS: Primary | ICD-10-CM

## 2023-12-06 NOTE — PROGRESS NOTES
Complete opacification of the left aspect of the sphenoid sinus without bony erosion or destruction on CTA of head and neck. Will refer to otolaryngology.

## 2023-12-13 ENCOUNTER — HOSPITAL ENCOUNTER (OUTPATIENT)
Dept: CT IMAGING | Facility: HOSPITAL | Age: 68
Discharge: HOME | End: 2023-12-13
Payer: MEDICARE

## 2023-12-13 DIAGNOSIS — J32.3 CHRONIC SPHENOIDAL SINUSITIS: ICD-10-CM

## 2023-12-13 PROCEDURE — G1004 CDSM NDSC: HCPCS

## 2023-12-13 PROCEDURE — 70486 CT MAXILLOFACIAL W/O DYE: CPT

## 2023-12-20 NOTE — RESULT ENCOUNTER NOTE
Call pt CT scan of sinuses showed sphenoid sinus completely normal   the other sinus had minimal issues in left maxillarysinus   no tx required

## 2024-06-13 DIAGNOSIS — I10 ESSENTIAL HYPERTENSION: ICD-10-CM

## 2024-06-14 RX ORDER — OLMESARTAN MEDOXOMIL 20 MG/1
TABLET ORAL
Qty: 180 TABLET | Refills: 0 | Status: SHIPPED | OUTPATIENT
Start: 2024-06-14

## 2024-09-09 DIAGNOSIS — I10 ESSENTIAL HYPERTENSION: ICD-10-CM

## 2024-09-09 RX ORDER — OLMESARTAN MEDOXOMIL 20 MG/1
TABLET ORAL
Qty: 180 TABLET | Refills: 0 | Status: SHIPPED | OUTPATIENT
Start: 2024-09-09

## 2024-10-02 ENCOUNTER — OFFICE VISIT (OUTPATIENT)
Dept: URGENT CARE | Facility: MEDICAL CENTER | Age: 69
End: 2024-10-02
Payer: MEDICARE

## 2024-10-02 VITALS
RESPIRATION RATE: 20 BRPM | TEMPERATURE: 98.3 F | HEIGHT: 74 IN | SYSTOLIC BLOOD PRESSURE: 141 MMHG | OXYGEN SATURATION: 98 % | WEIGHT: 223.8 LBS | DIASTOLIC BLOOD PRESSURE: 86 MMHG | BODY MASS INDEX: 28.72 KG/M2 | HEART RATE: 69 BPM

## 2024-10-02 DIAGNOSIS — J01.00 ACUTE MAXILLARY SINUSITIS, RECURRENCE NOT SPECIFIED: Primary | ICD-10-CM

## 2024-10-02 DIAGNOSIS — J40 BRONCHITIS: ICD-10-CM

## 2024-10-02 PROCEDURE — 99213 OFFICE O/P EST LOW 20 MIN: CPT | Performed by: PHYSICIAN ASSISTANT

## 2024-10-02 PROCEDURE — G0463 HOSPITAL OUTPT CLINIC VISIT: HCPCS | Performed by: PHYSICIAN ASSISTANT

## 2024-10-02 RX ORDER — FLUTICASONE PROPIONATE 50 MCG
1 SPRAY, SUSPENSION (ML) NASAL DAILY
Qty: 9.9 ML | Refills: 0 | Status: SHIPPED | OUTPATIENT
Start: 2024-10-02

## 2024-10-02 RX ORDER — AZITHROMYCIN 250 MG/1
TABLET, FILM COATED ORAL
Qty: 6 TABLET | Refills: 0 | Status: SHIPPED | OUTPATIENT
Start: 2024-10-02 | End: 2024-10-06

## 2024-10-02 RX ORDER — BENZONATATE 100 MG/1
100 CAPSULE ORAL 3 TIMES DAILY PRN
Qty: 20 CAPSULE | Refills: 0 | Status: SHIPPED | OUTPATIENT
Start: 2024-10-02

## 2024-10-02 NOTE — PROGRESS NOTES
"[facility]        NAME: Otto Ordonez is a 69 y.o. male  : 1955    MRN: [id number]  DATE: 2024  TIME: 10:54 AM    /86   Pulse 69   Temp 98.3 °F (36.8 °C) (Tympanic)   Resp 20   Ht 6' 1.5\" (1.867 m)   Wt 102 kg (223 lb 12.8 oz)   SpO2 98%   BMI 29.13 kg/m²     Assessment and Plan   Acute maxillary sinusitis, recurrence not specified [J01.00]  1. Acute maxillary sinusitis, recurrence not specified  azithromycin (ZITHROMAX) 250 mg tablet    fluticasone (FLONASE) 50 mcg/act nasal spray    benzonatate (TESSALON PERLES) 100 mg capsule      2. Bronchitis  azithromycin (ZITHROMAX) 250 mg tablet    fluticasone (FLONASE) 50 mcg/act nasal spray    benzonatate (TESSALON PERLES) 100 mg capsule            Patient Instructions       Follow up with PCP in 3-5 days.  Proceed to  ER if symptoms worsen.    Chief Complaint     Chief Complaint   Patient presents with    Cold Like Symptoms     Pt c/o head congestion for the past few weeks- taking Mucinex w/o minimal relief.  Home Covid test this AM - negative.  Denies fever  Also c/o dereased hearing right ear and malaise         History of Present Illness        Pt with 1-2 weeks nasal congestion and mild productive cough          Review of Systems   Review of Systems   Constitutional: Negative.    HENT: Negative.     Eyes: Negative.    Respiratory: Negative.     Cardiovascular: Negative.    Gastrointestinal: Negative.    Endocrine: Negative.    Genitourinary: Negative.    Musculoskeletal: Negative.    Skin: Negative.    Allergic/Immunologic: Negative.    Neurological: Negative.    Hematological: Negative.    Psychiatric/Behavioral: Negative.     All other systems reviewed and are negative.        Current Medications       Current Outpatient Medications:     azithromycin (ZITHROMAX) 250 mg tablet, Take 2 tablets today then 1 tablet daily x 4 days, Disp: 6 tablet, Rfl: 0    benzonatate (TESSALON PERLES) 100 mg capsule, Take 1 capsule (100 mg total) " "by mouth 3 (three) times a day as needed for cough, Disp: 20 capsule, Rfl: 0    fluticasone (FLONASE) 50 mcg/act nasal spray, 1 spray into each nostril daily, Disp: 9.9 mL, Rfl: 0    olmesartan (BENICAR) 20 mg tablet, TAKE ONE TABLET BY MOUTH TWICE DAILY, Disp: 180 tablet, Rfl: 0    RABEprazole (ACIPHEX) 20 MG tablet, every 24 hours, Disp: , Rfl:     nortriptyline (PAMELOR) 10 mg capsule, Take 1 capsule by mouth at bedtime for 1 week, than take 2 capsules by mouth at bedtime thereafter. (Patient not taking: Reported on 10/2/2024), Disp: 60 capsule, Rfl: 0    Current Allergies     Allergies as of 10/02/2024 - Reviewed 10/02/2024   Allergen Reaction Noted    Doxycycline Vomiting 12/12/2022            The following portions of the patient's history were reviewed and updated as appropriate: allergies, current medications, past family history, past medical history, past social history, past surgical history and problem list.     Past Medical History:   Diagnosis Date    GERD (gastroesophageal reflux disease)     Hypertension     Nasal congestion        Past Surgical History:   Procedure Laterality Date    APPENDECTOMY      At age 18.       Family History   Problem Relation Age of Onset    Coronary artery disease Mother     Heart failure Mother     Lung cancer Father     Cancer Father         Lung         Medications have been verified.        Objective   /86   Pulse 69   Temp 98.3 °F (36.8 °C) (Tympanic)   Resp 20   Ht 6' 1.5\" (1.867 m)   Wt 102 kg (223 lb 12.8 oz)   SpO2 98%   BMI 29.13 kg/m²        Physical Exam     Physical Exam  Vitals and nursing note reviewed.   Constitutional:       Appearance: Normal appearance. He is normal weight.      Comments: Home covid test negative    HENT:      Head: Normocephalic and atraumatic.      Right Ear: Tympanic membrane, ear canal and external ear normal.      Left Ear: Tympanic membrane, ear canal and external ear normal.      Ears:      Comments: Minimal cerumen " bilat      Nose: Congestion and rhinorrhea present.      Comments: Boggy mucosa  yellow d/c      Mouth/Throat:      Mouth: Mucous membranes are moist.      Pharynx: Oropharynx is clear.   Cardiovascular:      Rate and Rhythm: Normal rate and regular rhythm.      Pulses: Normal pulses.      Heart sounds: Normal heart sounds.   Pulmonary:      Effort: Pulmonary effort is normal.      Comments: Mild coarse sounds cleared with cough   Abdominal:      Palpations: Abdomen is soft.   Musculoskeletal:         General: Normal range of motion.      Cervical back: Normal range of motion and neck supple.   Skin:     General: Skin is warm.   Neurological:      Mental Status: He is alert and oriented to person, place, and time.

## 2024-10-08 NOTE — ASSESSMENT & PLAN NOTE
7/11/2024 lipid profile: Cholesterol 205, triglycerides 132, HDL 49, LDL calculated 130, non-HDL cholesterol 156    Patient has been very resistant to take cholesterol-lowering medication for his cholesterol.

## 2024-10-17 ENCOUNTER — OFFICE VISIT (OUTPATIENT)
Dept: CARDIOLOGY CLINIC | Facility: CLINIC | Age: 69
End: 2024-10-17
Payer: MEDICARE

## 2024-10-17 VITALS
BODY MASS INDEX: 29.6 KG/M2 | WEIGHT: 227.4 LBS | DIASTOLIC BLOOD PRESSURE: 80 MMHG | SYSTOLIC BLOOD PRESSURE: 138 MMHG | HEART RATE: 65 BPM

## 2024-10-17 DIAGNOSIS — I10 PRIMARY HYPERTENSION: Primary | ICD-10-CM

## 2024-10-17 DIAGNOSIS — E78.2 MIXED HYPERLIPIDEMIA: ICD-10-CM

## 2024-10-17 DIAGNOSIS — N52.9 VASCULOGENIC ERECTILE DYSFUNCTION, UNSPECIFIED VASCULOGENIC ERECTILE DYSFUNCTION TYPE: ICD-10-CM

## 2024-10-17 DIAGNOSIS — R93.1 AGATSTON CORONARY ARTERY CALCIUM SCORE LESS THAN 100: ICD-10-CM

## 2024-10-17 PROCEDURE — 93000 ELECTROCARDIOGRAM COMPLETE: CPT | Performed by: INTERNAL MEDICINE

## 2024-10-17 PROCEDURE — 99214 OFFICE O/P EST MOD 30 MIN: CPT | Performed by: INTERNAL MEDICINE

## 2024-10-17 RX ORDER — AMLODIPINE BESYLATE 5 MG/1
5 TABLET ORAL
Qty: 90 TABLET | Refills: 3 | Status: SHIPPED | OUTPATIENT
Start: 2024-10-17

## 2024-10-17 RX ORDER — SILDENAFIL 50 MG/1
50 TABLET, FILM COATED ORAL DAILY PRN
Qty: 90 TABLET | Refills: 3 | Status: SHIPPED | OUTPATIENT
Start: 2024-10-17

## 2024-10-17 NOTE — PROGRESS NOTES
[facility]  [address] [place] [address]  Phone#  [phone]   Fax#  [phone]  *-*-*-*-*-*-*-*-*-*-*-*-*-*-*-*-*-*-*-*-*-*-*-*-*-*-*-*-*-*-*-*-*-*-*-*-*-*-*-*-*-*-*-*-*-*-*-*-*-*-*-*-*-*                                   Cardiology Follow Up      ENCOUNTER DATE: 10/17/24 12:41 PM  PATIENT NAME: Otto Ordonez   : 1955    MRN: [id number]  AGE:69 y.o.      SEX: male  ENCOUNTER PROVIDER:Irvin Almeida MD     PRIMARY CARE PHYSICIAN: Jonathan Palomo MD    CARDIOLOGY SPECIALTY COMMENTS  Patient was originally seen in my old office for treatment of hypertension and hyperlipidemia.     Patient  seen in ED at [facility] on  for dizziness and difficulty walking. MRI/ MRA of the brain and neck were normal. Cholesterol elevated, ASCVD risk greater than 10%, consider starting statin.  Blood pressure 142/80, pulse 58.     Patient seen in the ED at [facility] on  10/22/2021. Blood pressure 123/60, pulse 64. Patient had total right knee replacement on 10/07/2021. Patient on oxycodone. Pt with episode of dizzy, pale and hypotension at home a couple of hours ago. SBP 80 at home.  Patient vomited twice in the afternoon.  Of note he  recently restarted his alfuzosin  which he had stop because it made him was affective in leaving the aiding his BPH symptoms.  On examination patient appeared fatigued and dehydrated. Labs did not confirm dehydration but he may have already received fluids.    2022 CT calcium score 66    2023 CTA of the head and neck with and without contrast: No acute intracranial abnormality mild smooth narrowing of right ICA origin and internal carotid bulb with less than 50% narrowing with no ulceration.  Complete opacification of the left aspect of this the sphenoid sinus without bony erosion or destruction    2020 [facility] lipid panel:  Total cholesterol 202, triglycerides 126, HDL 42, LDL calculated 135, non HDL cholesterol 160    2021 [facility]  lipid profile cholesterol 214, triglycerides 135, HDL 45, LDL calculated 142, non HDL cholesterol 169  09/08/2021 [facility] lipid profile: Cholesterol 212, triglycerides 145, HDL 43, LDL calculated 140, non HDL cholesterol 169  12/7/2022 [facility] lipid profile: Cholesterol 167, triglycerides 110, HDL 33, LDL calculated 112, non-HDL cholesterol 134  7/11/2024 lipid profile: Cholesterol 205, triglycerides 132, HDL 49, LDL calculated 130, non-HDL cholesterol 156    ACTIVE PROBLEM LIST  Patient Active Problem List   Diagnosis    Hypertension    Mixed hyperlipidemia    Benign prostatic hyperplasia with urinary obstruction    Personal history of kidney stones    Hypotension due to hypovolemia    Agatston coronary artery calcium score less than 100       ACTIVE DIAGNOSIS AND PLAN    1. Primary hypertension  Assessment & Plan:  10/2/24  141/86    Olmesartan 20 mg twice daily      Orders:  -     POCT ECG  -     amLODIPine (NORVASC) 5 mg tablet; Take 1 tablet (5 mg total) by mouth daily at bedtime  2. Agatston coronary artery calcium score less than 100  Assessment & Plan:  Patient asymptomatic  3. Mixed hyperlipidemia  Assessment & Plan:  7/11/2024 lipid profile: Cholesterol 205, triglycerides 132, HDL 49, LDL calculated 130, non-HDL cholesterol 156    Patient has been very resistant to take cholesterol-lowering medication for his cholesterol.  4. Vasculogenic erectile dysfunction, unspecified vasculogenic erectile dysfunction type  -     sildenafil (VIAGRA) 50 MG tablet; Take 1 tablet (50 mg total) by mouth daily as needed for erectile dysfunction     INTERVAL HISTORY:        Patient states that his blood pressure at home has been running 140/90 to 148/90.  Discussed with him increasing his antihypertensive medication and he was agreeable.    Discussed with patient that his LDL is 130 and normal is less than 100.  He still does not want to take a statin to lower his cholesterol.    Patient had a calcium score in 5/2022 which was 66.   Patient asked whether he should have his calcium score repeated.  I told him that the general recommendations would be every 5 years up to an age of 85 at which time older than 85 predictive data is not available.    Patient also stated that he is almost out of his Viagra medication for erectile dysfunction.    DISCUSSION/PLAN:          Begin amlodipine 5 mg daily at bedtime  Sildenafil 50 mg, 1 tablet as needed, dispense 90, 3 refills  Return in 1 year  EKG on return    Results for orders placed or performed in visit on 10/17/24   POCT ECG    Narrative    Normal sinus rhythm at a rate of 65 bpm.  Normal EKG.         Lab Studies:      7/11/2024 lipid profile: Cholesterol 205, triglycerides 132, HDL 49, LDL calculated 130, non-HDL cholesterol 156    Lab Results   Component Value Date    HGBA1C 5.8 (H) 09/08/2021      Lab Results   Component Value Date    EGFR 84 07/11/2024    EGFR 72 11/16/2023    EGFR 94 05/22/2023    SODIUM 142 07/11/2024    SODIUM 139 11/16/2023    SODIUM 140 05/22/2023    K 4.5 07/11/2024    K 3.6 11/16/2023    K 4.3 05/22/2023     07/11/2024     11/16/2023     05/22/2023    CO2 27 07/11/2024    CO2 27 11/16/2023    CO2 26 05/22/2023    BUN 17 07/11/2024    BUN 15 11/16/2023    BUN 16 05/22/2023    CREATININE 0.97 07/11/2024    CREATININE 1.05 11/16/2023    CREATININE 0.87 05/22/2023     Lab Results   Component Value Date    HGB 12.4 (L) 10/08/2021    HCT 35.3 (L) 10/08/2021      Lab Results   Component Value Date    CALCIUM 9.1 07/11/2024    CALCIUM 9.2 11/16/2023    CALCIUM 9.2 05/22/2023    ALB 4.1 07/11/2024    ALB 4.3 11/16/2023    ALB 4.2 05/22/2023    TP 6.4 07/11/2024    TP 6.9 11/16/2023    TP 6.4 05/22/2023    AST 13 07/11/2024    AST 17 11/16/2023    AST 20 05/22/2023    ALT 15 07/11/2024    ALT 17 11/16/2023    ALT 17 05/22/2023    ALKPHOS 74 07/11/2024    ALKPHOS 92 11/16/2023    ALKPHOS 77 05/22/2023       Lab Results   Component Value Date    TSH 1.75 09/08/2021     TSH 2.84 10/18/2019       Current Outpatient Medications:     amLODIPine (NORVASC) 5 mg tablet, Take 1 tablet (5 mg total) by mouth daily at bedtime, Disp: 90 tablet, Rfl: 3    benzonatate (TESSALON PERLES) 100 mg capsule, Take 1 capsule (100 mg total) by mouth 3 (three) times a day as needed for cough, Disp: 20 capsule, Rfl: 0    fluticasone (FLONASE) 50 mcg/act nasal spray, 1 spray into each nostril daily, Disp: 9.9 mL, Rfl: 0    olmesartan (BENICAR) 20 mg tablet, TAKE ONE TABLET BY MOUTH TWICE DAILY, Disp: 180 tablet, Rfl: 0    RABEprazole (ACIPHEX) 20 MG tablet, every 24 hours, Disp: , Rfl:     sildenafil (VIAGRA) 50 MG tablet, Take 1 tablet (50 mg total) by mouth daily as needed for erectile dysfunction, Disp: 90 tablet, Rfl: 3  Allergies   Allergen Reactions    Amoxicillin Vomiting    Doxycycline Vomiting    Potassium Clavulanate [Clavulanic Acid] Vomiting       Past Medical History:   Diagnosis Date    GERD (gastroesophageal reflux disease)     Hypertension     Nasal congestion      Social History     Socioeconomic History    Marital status: /Civil Union     Spouse name: Not on file    Number of children: Not on file    Years of education: Not on file    Highest education level: Not on file   Occupational History    Not on file   Tobacco Use    Smoking status: Never    Smokeless tobacco: Never   Vaping Use    Vaping status: Never Used   Substance and Sexual Activity    Alcohol use: Yes     Alcohol/week: 4.0 standard drinks of alcohol     Types: 4 Shots of liquor per week     Comment: socially    Drug use: Never    Sexual activity: Yes     Partners: Female   Other Topics Concern    Not on file   Social History Narrative    Not on file     Social Determinants of Health     Financial Resource Strain: Not on file   Food Insecurity: Not on file   Transportation Needs: Not on file   Physical Activity: Not on file   Stress: Not on file   Social Connections: Unknown (6/18/2024)    Received from [facility]     Social Connections     How often do you feel lonely or isolated from those around you? (Adult - for ages 18 years and over): Not on file   Intimate Partner Violence: Not on file   Housing Stability: Not on file      Family History   Problem Relation Age of Onset    Coronary artery disease Mother     Heart failure Mother     Lung cancer Father     Cancer Father         Lung     Past Surgical History:   Procedure Laterality Date    APPENDECTOMY      At age 18.       PREVIOUS WEIGHTS:   Wt Readings from Last 10 Encounters:   10/17/24 103 kg (227 lb 6.4 oz)   10/02/24 102 kg (223 lb 12.8 oz)   12/21/23 103 kg (227 lb)   12/07/23 103 kg (227 lb)   11/16/23 103 kg (227 lb 9.6 oz)   06/07/23 102 kg (225 lb 9.6 oz)   12/06/22 102 kg (225 lb 6.4 oz)   06/20/22 101 kg (222 lb 6.4 oz)   12/17/21 98.4 kg (217 lb)   10/27/21 95.7 kg (211 lb)        Review of Systems:  Review of Systems   Constitutional:  Negative for activity change.   Respiratory:  Negative for cough, chest tightness, shortness of breath and wheezing.    Cardiovascular:  Negative for chest pain, palpitations and leg swelling.   Musculoskeletal:  Negative for gait problem.   Skin:  Negative for color change.   Neurological:  Negative for dizziness, tremors, syncope, weakness, light-headedness and headaches.   Psychiatric/Behavioral:  Negative for agitation and confusion.        Physical Exam:  /80 (BP Location: Left arm, Patient Position: Sitting, Cuff Size: Adult)   Pulse 65   Wt 103 kg (227 lb 6.4 oz)   BMI 29.60 kg/m²     Physical Exam  Vitals reviewed.   Constitutional:       General: He is not in acute distress.     Appearance: He is well-developed.   HENT:      Head: Normocephalic and atraumatic.   Neck:      Thyroid: No thyromegaly.      Vascular: No carotid bruit or JVD.      Trachea: No tracheal deviation.   Cardiovascular:      Rate and Rhythm: Normal rate and regular rhythm.      Pulses: Normal pulses.      Heart sounds: Normal heart sounds.  "No murmur heard.     No friction rub. No gallop.   Pulmonary:      Effort: Pulmonary effort is normal. No respiratory distress.      Breath sounds: Normal breath sounds. No wheezing, rhonchi or rales.   Chest:      Chest wall: No tenderness.   Musculoskeletal:      Cervical back: Normal range of motion and neck supple.      Right lower leg: No edema.      Left lower leg: No edema.   Skin:     General: Skin is warm and dry.   Neurological:      General: No focal deficit present.      Mental Status: He is alert and oriented to person, place, and time.   Psychiatric:         Mood and Affect: Mood normal.         Behavior: Behavior normal.         Thought Content: Thought content normal.         Judgment: Judgment normal.       ======================================================  Imaging:   Results Review Statement: No pertinent imaging studies reviewed.    Portions of the record may have been created with voice recognition software. Occasional wrong word or \"sound a like\" substitutions may have occurred due to the inherent limitations of voice recognition software. Read the chart carefully and recognize, using context, where substitutions have occurred.    SIGNATURES:   Irvin Almeida MD   "

## 2024-11-26 DIAGNOSIS — I10 ESSENTIAL HYPERTENSION: ICD-10-CM

## 2024-11-27 RX ORDER — OLMESARTAN MEDOXOMIL 20 MG/1
20 TABLET ORAL 2 TIMES DAILY
Qty: 180 TABLET | Refills: 1 | Status: SHIPPED | OUTPATIENT
Start: 2024-11-27

## 2024-12-05 PROCEDURE — 87205 SMEAR GRAM STAIN: CPT | Performed by: PHYSICIAN ASSISTANT

## 2024-12-05 PROCEDURE — 87070 CULTURE OTHR SPECIMN AEROBIC: CPT | Performed by: PHYSICIAN ASSISTANT

## 2025-06-26 ENCOUNTER — OFFICE VISIT (OUTPATIENT)
Dept: CARDIOLOGY CLINIC | Facility: CLINIC | Age: 70
End: 2025-06-26
Payer: MEDICARE

## 2025-06-26 VITALS
DIASTOLIC BLOOD PRESSURE: 80 MMHG | SYSTOLIC BLOOD PRESSURE: 130 MMHG | HEART RATE: 80 BPM | BODY MASS INDEX: 29.39 KG/M2 | WEIGHT: 225.8 LBS

## 2025-06-26 DIAGNOSIS — R93.1 AGATSTON CORONARY ARTERY CALCIUM SCORE LESS THAN 100: ICD-10-CM

## 2025-06-26 DIAGNOSIS — E78.2 MIXED HYPERLIPIDEMIA: ICD-10-CM

## 2025-06-26 DIAGNOSIS — I10 ESSENTIAL HYPERTENSION: Primary | ICD-10-CM

## 2025-06-26 PROCEDURE — G2211 COMPLEX E/M VISIT ADD ON: HCPCS | Performed by: STUDENT IN AN ORGANIZED HEALTH CARE EDUCATION/TRAINING PROGRAM

## 2025-06-26 PROCEDURE — 99214 OFFICE O/P EST MOD 30 MIN: CPT | Performed by: STUDENT IN AN ORGANIZED HEALTH CARE EDUCATION/TRAINING PROGRAM

## 2025-06-26 RX ORDER — HYDROCHLOROTHIAZIDE 12.5 MG/1
TABLET ORAL
COMMUNITY
Start: 2025-06-20

## 2025-06-26 RX ORDER — OLMESARTAN MEDOXOMIL 20 MG/1
20 TABLET ORAL DAILY
Qty: 90 TABLET | Refills: 3 | Status: SHIPPED | OUTPATIENT
Start: 2025-06-26

## 2025-06-26 NOTE — PROGRESS NOTES
Cardiology Consultation     Otto Ordonez  [id number]  1955  [facility] [place]  [address] [address]      1. Essential hypertension        2. Agatston coronary artery calcium score less than 100        3. Mixed hyperlipidemia            Discussion/Summary:  Hypertension  - Continue with olmesartan 20 mg daily and HCTZ 12.5 mg daily  - Patient reduced olmesartan to 20 mg daily after starting HCTZ due to hypotension  - Blood pressure is well-controlled today  Hyperlipidemia  - Not on statin currently  - Lipid profile 12/4/2024 showed total cholesterol 197, triglycerides 161, HDL 35,   - Not interested in starting a statin at this time  Coronary artery calcifications  - Coronary artery calcium score 5/13/2022 showed total calcium score of 66-> LAD 45, LCx 21, Finley trial 43rd percentile  - Can consider repeating coronary calcium score 5 years from previous  Ménière's disease  - Following with ENT  - Currently on meclizine and HCTZ 12.5 mg daily  BPH    Follow-up in 1 year.    History of Present Illness:  Otto Ordonez is a 70 y.o. year old male with a past medical history of hypertension, hyperlipidemia, BPH, and coronary artery calcifications.  He reports feeling well today from a cardiac standpoint.  Denies any episodes of chest pain, palpitations, shortness of breath, lower extremity edema, or other concerning cardiac symptoms at this time.  He was diagnosed with Ménière's disease recently.  He has been taking meclizine and HCTZ to treat it which has improved his symptoms.  He does not want to stay on meclizine long-term.  His ENT doctor reduced it from 25 to 12.5 mg daily.      Problem List[1]  Past Medical History[2]  Social History     Socioeconomic History    Marital status: /Civil Union     Spouse name: Not on file    Number of children: Not on file    Years of education: Not on file    Highest education level: Not on file   Occupational History    Not on  file   Tobacco Use    Smoking status: Never    Smokeless tobacco: Never   Vaping Use    Vaping status: Never Used   Substance and Sexual Activity    Alcohol use: Yes     Alcohol/week: 4.0 standard drinks of alcohol     Types: 4 Shots of liquor per week     Comment: socially    Drug use: Never    Sexual activity: Yes     Partners: Female   Other Topics Concern    Not on file   Social History Narrative    Not on file     Social Drivers of Health     Financial Resource Strain: Low Risk  (12/3/2024)    Received from [facility]    Overall Financial Resource Strain (CARDIA)     Difficulty of Paying Living Expenses: Not hard at all   Food Insecurity: No Food Insecurity (12/3/2024)    Received from [facility]    Hunger Vital Sign     Within the past 12 months, you worried that your food would run out before you got the money to buy more.: Never true     Within the past 12 months, the food you bought just didn't last and you didn't have money to get more.: Never true   Transportation Needs: No Transportation Needs (12/3/2024)    Received from [facility]    PRAPARE - Transportation     Lack of Transportation (Medical): No     Lack of Transportation (Non-Medical): No   Physical Activity: Not on file   Stress: Not on file   Social Connections: Unknown (6/18/2024)    Received from [facility]     How often do you feel lonely or isolated from those around you? (Adult - for ages 18 years and over): Not on file   Intimate Partner Violence: Not At Risk (12/3/2024)    Received from [facility]    Humiliation, Afraid, Rape, and Kick questionnaire     Within the last year, have you been afraid of your partner or ex-partner?: No     Within the last year, have you been humiliated or emotionally abused in other ways by your partner or ex-partner?: No     Within the last year, have you been kicked, hit, slapped, or otherwise physically hurt by your  partner or ex-partner?: No     Within the last year, have you been raped or forced to have any kind of sexual activity by your partner or ex-partner?: No   Housing Stability: Low Risk  (12/3/2024)    Received from [facility]    Housing Stability Vital Sign     In the last 12 months, was there a time when you were not able to pay the mortgage or rent on time?: No     In the past 12 months, how many times have you moved where you were living?: 1     At any time in the past 12 months, were you homeless or living in a shelter (including now)?: No      Family History[3]  Past Surgical History[4]  Current Medications[5]  Allergies   Allergen Reactions    Amoxicillin Vomiting    Doxycycline Vomiting    Potassium Clavulanate [Clavulanic Acid] Vomiting         Labs:  Lab Results   Component Value Date    ALT 15 12/02/2024    AST 16 12/02/2024    BUN 12 12/03/2024    CALCIUM 8.7 12/03/2024     12/03/2024    CO2 26 12/03/2024    CREATININE 0.98 12/03/2024    HCT 35.3 (L) 10/08/2021    HGB 12.4 (L) 10/08/2021    HGBA1C 5.7 (H) 12/03/2024    MG 2.2 10/22/2021    K 3.7 12/03/2024    PSA 2.82 11/16/2023       Imaging: No results found.    ECG: 10/17/2024: Normal sinus rhythm at a rate of 65 bpm, normal ECG    Review of Systems:  Review of Systems   Constitutional:  Negative for chills, diaphoresis, fatigue and fever.   HENT:  Negative for congestion.    Eyes:  Negative for photophobia and visual disturbance.   Respiratory:  Negative for chest tightness and shortness of breath.    Cardiovascular:  Negative for chest pain, palpitations and leg swelling.   Gastrointestinal:  Negative for abdominal distention, abdominal pain, diarrhea, nausea and vomiting.   Genitourinary:  Negative for difficulty urinating and dysuria.   Musculoskeletal:  Negative for arthralgias, gait problem and joint swelling.   Skin:  Negative for color change, pallor and rash.   Neurological:  Positive for dizziness. Negative for syncope,  numbness and headaches.   Psychiatric/Behavioral:  Negative for agitation, behavioral problems and confusion.          Vitals:    06/26/25 1634   BP: 130/80   Pulse: 80      Vitals:    06/26/25 1634   Weight: 102 kg (225 lb 12.8 oz)           Physical Exam:  General appearance:  Appears stated age, alert, well appearing and in no distress  HEENT:  PERRLA, EOMI, no scleral icterus, no conjunctival pallor  NECK:  Supple, No elevated JVP, no thyromegaly, no carotid bruits  HEART:  Regular rate and rhythm, normal S1/S2, no S3/S4, no significant audible murmur  LUNGS:  Clear to auscultation bilaterally  ABDOMEN:  Soft, non-tender, positive bowel sounds, no rebound or guarding, no organomegaly   EXTREMITIES:  No edema  VASCULAR:  Normal pedal pulses   SKIN: No lesions or rashes on exposed skin  NEURO:  CN II-XII intact, no focal deficits          [1]   Patient Active Problem List  Diagnosis    Hypertension    Mixed hyperlipidemia    Benign prostatic hyperplasia with urinary obstruction    Personal history of kidney stones    Hypotension due to hypovolemia    Agatston coronary artery calcium score less than 100   [2]   Past Medical History:  Diagnosis Date    GERD (gastroesophageal reflux disease)     Hypertension     Nasal congestion    [3]   Family History  Problem Relation Name Age of Onset    Coronary artery disease Mother Herman Ordonez     Heart failure Mother Herman Ordonez     Lung cancer Father Otto Ordnoez     Cancer Father Otto Ordonez         Lung   [4]   Past Surgical History:  Procedure Laterality Date    APPENDECTOMY      At age 18.   [5]   Current Outpatient Medications:     hydroCHLOROthiazide 12.5 mg tablet, , Disp: , Rfl:     meclizine (ANTIVERT) 12.5 MG tablet, Take 1 tablet (12.5 mg total) by mouth 3 (three) times a day as needed for dizziness, Disp: 30 tablet, Rfl: 1    olmesartan (BENICAR) 20 mg tablet, TAKE ONE TABLET BY MOUTH TWICE DAILY (Patient taking differently: Take 20 mg by mouth  daily), Disp: 180 tablet, Rfl: 1    promethazine (PHENERGAN) 12.5 MG tablet, Take 1-2 tablets every six hours as needed for dizziness/nausea., Disp: 60 tablet, Rfl: 0    RABEprazole (ACIPHEX) 20 MG tablet, every 24 hours, Disp: , Rfl:     amLODIPine (NORVASC) 5 mg tablet, Take 1 tablet (5 mg total) by mouth daily at bedtime (Patient not taking: Reported on 1/3/2025), Disp: 90 tablet, Rfl: 3    benzonatate (TESSALON PERLES) 100 mg capsule, Take 1 capsule (100 mg total) by mouth 3 (three) times a day as needed for cough (Patient not taking: Reported on 10/18/2024), Disp: 20 capsule, Rfl: 0